# Patient Record
Sex: MALE | Race: WHITE | Employment: FULL TIME | URBAN - NONMETROPOLITAN AREA
[De-identification: names, ages, dates, MRNs, and addresses within clinical notes are randomized per-mention and may not be internally consistent; named-entity substitution may affect disease eponyms.]

---

## 2019-09-30 ENCOUNTER — APPOINTMENT (OUTPATIENT)
Dept: GENERAL RADIOLOGY | Age: 59
End: 2019-09-30
Payer: COMMERCIAL

## 2019-09-30 ENCOUNTER — HOSPITAL ENCOUNTER (EMERGENCY)
Age: 59
Discharge: HOME | End: 2019-09-30
Payer: COMMERCIAL

## 2019-09-30 VITALS
HEART RATE: 78 BPM | RESPIRATION RATE: 20 BRPM | OXYGEN SATURATION: 96 % | DIASTOLIC BLOOD PRESSURE: 74 MMHG | SYSTOLIC BLOOD PRESSURE: 103 MMHG | TEMPERATURE: 98.4 F

## 2019-09-30 DIAGNOSIS — S46.812A STRAIN OF LEFT DELTOID MUSCLE, INITIAL ENCOUNTER: Primary | ICD-10-CM

## 2019-09-30 PROCEDURE — 99283 EMERGENCY DEPT VISIT LOW MDM: CPT

## 2019-09-30 PROCEDURE — 73030 X-RAY EXAM OF SHOULDER: CPT

## 2019-09-30 PROCEDURE — 6370000000 HC RX 637 (ALT 250 FOR IP): Performed by: NURSE PRACTITIONER

## 2019-09-30 PROCEDURE — 2709999900 HC NON-CHARGEABLE SUPPLY

## 2019-09-30 RX ORDER — NAPROXEN 250 MG/1
500 TABLET ORAL ONCE
Status: COMPLETED | OUTPATIENT
Start: 2019-09-30 | End: 2019-09-30

## 2019-09-30 RX ORDER — NAPROXEN 500 MG/1
500 TABLET ORAL 2 TIMES DAILY WITH MEALS
Qty: 30 TABLET | Refills: 0 | Status: ON HOLD | OUTPATIENT
Start: 2019-09-30 | End: 2020-03-04 | Stop reason: HOSPADM

## 2019-09-30 RX ADMIN — NAPROXEN 500 MG: 250 TABLET ORAL at 14:32

## 2019-09-30 ASSESSMENT — PAIN DESCRIPTION - ORIENTATION: ORIENTATION: LEFT

## 2019-09-30 ASSESSMENT — PAIN SCALES - GENERAL
PAINLEVEL_OUTOF10: 0
PAINLEVEL_OUTOF10: 8

## 2019-09-30 ASSESSMENT — ENCOUNTER SYMPTOMS
COLOR CHANGE: 0
BACK PAIN: 0
CHEST TIGHTNESS: 0
SHORTNESS OF BREATH: 0

## 2019-09-30 ASSESSMENT — PAIN DESCRIPTION - PAIN TYPE: TYPE: ACUTE PAIN

## 2019-09-30 ASSESSMENT — PAIN DESCRIPTION - LOCATION: LOCATION: SHOULDER

## 2020-02-29 ENCOUNTER — APPOINTMENT (OUTPATIENT)
Dept: GENERAL RADIOLOGY | Age: 60
DRG: 287 | End: 2020-02-29
Payer: COMMERCIAL

## 2020-02-29 ENCOUNTER — HOSPITAL ENCOUNTER (INPATIENT)
Age: 60
LOS: 1 days | Discharge: HOME | DRG: 287 | End: 2020-03-04
Attending: FAMILY MEDICINE | Admitting: INTERNAL MEDICINE
Payer: COMMERCIAL

## 2020-02-29 PROBLEM — R07.9 CHEST PAIN: Status: ACTIVE | Noted: 2020-02-29

## 2020-02-29 PROBLEM — E11.9 DM (DIABETES MELLITUS) (HCC): Status: ACTIVE | Noted: 2020-02-29

## 2020-02-29 PROBLEM — E87.5 HYPERKALEMIA: Status: ACTIVE | Noted: 2020-02-29

## 2020-02-29 PROBLEM — R73.9 HYPERGLYCEMIA: Status: ACTIVE | Noted: 2020-02-29

## 2020-02-29 LAB
ALBUMIN SERPL-MCNC: 3.7 G/DL (ref 3.5–5.1)
ALP BLD-CCNC: 34 U/L (ref 38–126)
ALT SERPL-CCNC: 18 U/L (ref 11–66)
ANION GAP SERPL CALCULATED.3IONS-SCNC: 10 MEQ/L (ref 8–16)
AST SERPL-CCNC: 12 U/L (ref 5–40)
AVERAGE GLUCOSE: 153 MG/DL (ref 70–126)
BASOPHILS # BLD: 0.5 %
BASOPHILS ABSOLUTE: 0 THOU/MM3 (ref 0–0.1)
BILIRUB SERPL-MCNC: 0.3 MG/DL (ref 0.3–1.2)
BUN BLDV-MCNC: 14 MG/DL (ref 7–22)
CALCIUM SERPL-MCNC: 9.1 MG/DL (ref 8.5–10.5)
CHLORIDE BLD-SCNC: 100 MEQ/L (ref 98–111)
CO2: 28 MEQ/L (ref 23–33)
CREAT SERPL-MCNC: 0.8 MG/DL (ref 0.4–1.2)
EOSINOPHIL # BLD: 2.5 %
EOSINOPHILS ABSOLUTE: 0.1 THOU/MM3 (ref 0–0.4)
ERYTHROCYTE [DISTWIDTH] IN BLOOD BY AUTOMATED COUNT: 13.2 % (ref 11.5–14.5)
ERYTHROCYTE [DISTWIDTH] IN BLOOD BY AUTOMATED COUNT: 47.8 FL (ref 35–45)
GFR SERPL CREATININE-BSD FRML MDRD: > 90 ML/MIN/1.73M2
GLUCOSE BLD-MCNC: 138 MG/DL (ref 70–108)
GLUCOSE BLD-MCNC: 243 MG/DL (ref 70–108)
HBA1C MFR BLD: 7.1 % (ref 4.4–6.4)
HCT VFR BLD CALC: 38.7 % (ref 42–52)
HEMOGLOBIN: 12.4 GM/DL (ref 14–18)
IMMATURE GRANS (ABS): 0.02 THOU/MM3 (ref 0–0.07)
IMMATURE GRANULOCYTES: 0.4 %
LYMPHOCYTES # BLD: 27.3 %
LYMPHOCYTES ABSOLUTE: 1.5 THOU/MM3 (ref 1–4.8)
MCH RBC QN AUTO: 31.8 PG (ref 26–33)
MCHC RBC AUTO-ENTMCNC: 32 GM/DL (ref 32.2–35.5)
MCV RBC AUTO: 99.2 FL (ref 80–94)
MONOCYTES # BLD: 9.9 %
MONOCYTES ABSOLUTE: 0.6 THOU/MM3 (ref 0.4–1.3)
NUCLEATED RED BLOOD CELLS: 0 /100 WBC
OSMOLALITY CALCULATION: 284.2 MOSMOL/KG (ref 275–300)
PLATELET # BLD: 233 THOU/MM3 (ref 130–400)
PMV BLD AUTO: 9.4 FL (ref 9.4–12.4)
POTASSIUM SERPL-SCNC: 5.3 MEQ/L (ref 3.5–5.2)
PRO-BNP: 396 PG/ML (ref 0–900)
RBC # BLD: 3.9 MILL/MM3 (ref 4.7–6.1)
SEG NEUTROPHILS: 59.4 %
SEGMENTED NEUTROPHILS ABSOLUTE COUNT: 3.3 THOU/MM3 (ref 1.8–7.7)
SODIUM BLD-SCNC: 138 MEQ/L (ref 135–145)
TOTAL PROTEIN: 6 G/DL (ref 6.1–8)
TROPONIN T: < 0.01 NG/ML
TROPONIN T: < 0.01 NG/ML
WBC # BLD: 5.6 THOU/MM3 (ref 4.8–10.8)

## 2020-02-29 PROCEDURE — 93005 ELECTROCARDIOGRAM TRACING: CPT | Performed by: FAMILY MEDICINE

## 2020-02-29 PROCEDURE — 2580000003 HC RX 258: Performed by: INTERNAL MEDICINE

## 2020-02-29 PROCEDURE — 83036 HEMOGLOBIN GLYCOSYLATED A1C: CPT

## 2020-02-29 PROCEDURE — 6370000000 HC RX 637 (ALT 250 FOR IP): Performed by: FAMILY MEDICINE

## 2020-02-29 PROCEDURE — 6370000000 HC RX 637 (ALT 250 FOR IP): Performed by: INTERNAL MEDICINE

## 2020-02-29 PROCEDURE — G0378 HOSPITAL OBSERVATION PER HR: HCPCS

## 2020-02-29 PROCEDURE — 84484 ASSAY OF TROPONIN QUANT: CPT

## 2020-02-29 PROCEDURE — 36415 COLL VENOUS BLD VENIPUNCTURE: CPT

## 2020-02-29 PROCEDURE — 83880 ASSAY OF NATRIURETIC PEPTIDE: CPT

## 2020-02-29 PROCEDURE — 80053 COMPREHEN METABOLIC PANEL: CPT

## 2020-02-29 PROCEDURE — 82948 REAGENT STRIP/BLOOD GLUCOSE: CPT

## 2020-02-29 PROCEDURE — 6360000002 HC RX W HCPCS: Performed by: INTERNAL MEDICINE

## 2020-02-29 PROCEDURE — 71046 X-RAY EXAM CHEST 2 VIEWS: CPT

## 2020-02-29 PROCEDURE — 99285 EMERGENCY DEPT VISIT HI MDM: CPT

## 2020-02-29 PROCEDURE — 96372 THER/PROPH/DIAG INJ SC/IM: CPT

## 2020-02-29 PROCEDURE — 85025 COMPLETE CBC W/AUTO DIFF WBC: CPT

## 2020-02-29 PROCEDURE — 99220 PR INITIAL OBSERVATION CARE/DAY 70 MINUTES: CPT | Performed by: INTERNAL MEDICINE

## 2020-02-29 RX ORDER — PIOGLITAZONEHYDROCHLORIDE 45 MG/1
45 TABLET ORAL DAILY
COMMUNITY

## 2020-02-29 RX ORDER — ONDANSETRON 2 MG/ML
4 INJECTION INTRAMUSCULAR; INTRAVENOUS EVERY 6 HOURS PRN
Status: DISCONTINUED | OUTPATIENT
Start: 2020-02-29 | End: 2020-03-04 | Stop reason: HOSPADM

## 2020-02-29 RX ORDER — SIMVASTATIN 20 MG
20 TABLET ORAL NIGHTLY
COMMUNITY

## 2020-02-29 RX ORDER — SIMVASTATIN 20 MG
20 TABLET ORAL NIGHTLY
Status: DISCONTINUED | OUTPATIENT
Start: 2020-02-29 | End: 2020-03-01

## 2020-02-29 RX ORDER — GABAPENTIN 300 MG/1
300 CAPSULE ORAL 3 TIMES DAILY
Status: DISCONTINUED | OUTPATIENT
Start: 2020-02-29 | End: 2020-03-04 | Stop reason: HOSPADM

## 2020-02-29 RX ORDER — NITROGLYCERIN 0.4 MG/1
0.4 TABLET SUBLINGUAL EVERY 5 MIN PRN
Status: DISCONTINUED | OUTPATIENT
Start: 2020-02-29 | End: 2020-03-04 | Stop reason: HOSPADM

## 2020-02-29 RX ORDER — SODIUM CHLORIDE 0.9 % (FLUSH) 0.9 %
10 SYRINGE (ML) INJECTION EVERY 12 HOURS SCHEDULED
Status: DISCONTINUED | OUTPATIENT
Start: 2020-02-29 | End: 2020-03-04 | Stop reason: HOSPADM

## 2020-02-29 RX ORDER — ACETAMINOPHEN 325 MG/1
650 TABLET ORAL EVERY 6 HOURS PRN
Status: DISCONTINUED | OUTPATIENT
Start: 2020-02-29 | End: 2020-03-04 | Stop reason: HOSPADM

## 2020-02-29 RX ORDER — POLYETHYLENE GLYCOL 3350 17 G/17G
17 POWDER, FOR SOLUTION ORAL DAILY
Status: DISCONTINUED | OUTPATIENT
Start: 2020-02-29 | End: 2020-03-04 | Stop reason: HOSPADM

## 2020-02-29 RX ORDER — SODIUM CHLORIDE 9 MG/ML
INJECTION, SOLUTION INTRAVENOUS CONTINUOUS
Status: DISCONTINUED | OUTPATIENT
Start: 2020-02-29 | End: 2020-03-02

## 2020-02-29 RX ORDER — ESCITALOPRAM OXALATE 20 MG/1
20 TABLET ORAL DAILY
COMMUNITY

## 2020-02-29 RX ORDER — DEXTROSE MONOHYDRATE 25 G/50ML
12.5 INJECTION, SOLUTION INTRAVENOUS PRN
Status: DISCONTINUED | OUTPATIENT
Start: 2020-02-29 | End: 2020-03-04 | Stop reason: HOSPADM

## 2020-02-29 RX ORDER — ISOSORBIDE MONONITRATE 60 MG/1
60 TABLET, EXTENDED RELEASE ORAL DAILY
COMMUNITY
End: 2021-03-11 | Stop reason: SDUPTHER

## 2020-02-29 RX ORDER — ASPIRIN 81 MG/1
81 TABLET, CHEWABLE ORAL DAILY
Status: DISCONTINUED | OUTPATIENT
Start: 2020-03-01 | End: 2020-03-04 | Stop reason: HOSPADM

## 2020-02-29 RX ORDER — ACETAMINOPHEN 650 MG/1
650 SUPPOSITORY RECTAL EVERY 6 HOURS PRN
Status: DISCONTINUED | OUTPATIENT
Start: 2020-02-29 | End: 2020-03-04 | Stop reason: HOSPADM

## 2020-02-29 RX ORDER — PROMETHAZINE HYDROCHLORIDE 25 MG/1
12.5 TABLET ORAL EVERY 6 HOURS PRN
Status: DISCONTINUED | OUTPATIENT
Start: 2020-02-29 | End: 2020-03-04 | Stop reason: HOSPADM

## 2020-02-29 RX ORDER — GABAPENTIN 300 MG/1
300 CAPSULE ORAL 3 TIMES DAILY
Status: ON HOLD | COMMUNITY
End: 2022-09-12 | Stop reason: HOSPADM

## 2020-02-29 RX ORDER — DEXTROSE MONOHYDRATE 50 MG/ML
100 INJECTION, SOLUTION INTRAVENOUS PRN
Status: DISCONTINUED | OUTPATIENT
Start: 2020-02-29 | End: 2020-03-04 | Stop reason: HOSPADM

## 2020-02-29 RX ORDER — FAMOTIDINE 20 MG/1
20 TABLET, FILM COATED ORAL 2 TIMES DAILY
Status: DISCONTINUED | OUTPATIENT
Start: 2020-02-29 | End: 2020-03-04 | Stop reason: HOSPADM

## 2020-02-29 RX ORDER — LISINOPRIL 20 MG/1
20 TABLET ORAL DAILY
Status: DISCONTINUED | OUTPATIENT
Start: 2020-03-01 | End: 2020-03-01

## 2020-02-29 RX ORDER — ESCITALOPRAM OXALATE 20 MG/1
20 TABLET ORAL DAILY
Status: DISCONTINUED | OUTPATIENT
Start: 2020-03-01 | End: 2020-03-04 | Stop reason: HOSPADM

## 2020-02-29 RX ORDER — SODIUM CHLORIDE 0.9 % (FLUSH) 0.9 %
10 SYRINGE (ML) INJECTION PRN
Status: DISCONTINUED | OUTPATIENT
Start: 2020-02-29 | End: 2020-03-04 | Stop reason: HOSPADM

## 2020-02-29 RX ORDER — NICOTINE POLACRILEX 4 MG
15 LOZENGE BUCCAL PRN
Status: DISCONTINUED | OUTPATIENT
Start: 2020-02-29 | End: 2020-03-04 | Stop reason: HOSPADM

## 2020-02-29 RX ORDER — LISINOPRIL 20 MG/1
10 TABLET ORAL DAILY
Status: ON HOLD | COMMUNITY
End: 2020-03-04 | Stop reason: HOSPADM

## 2020-02-29 RX ORDER — ISOSORBIDE MONONITRATE 60 MG/1
60 TABLET, EXTENDED RELEASE ORAL DAILY
Status: DISCONTINUED | OUTPATIENT
Start: 2020-03-01 | End: 2020-03-04 | Stop reason: HOSPADM

## 2020-02-29 RX ADMIN — SODIUM CHLORIDE: 9 INJECTION, SOLUTION INTRAVENOUS at 21:04

## 2020-02-29 RX ADMIN — NITROGLYCERIN 0.4 MG: 0.4 TABLET, ORALLY DISINTEGRATING SUBLINGUAL at 17:41

## 2020-02-29 RX ADMIN — ENOXAPARIN SODIUM 40 MG: 40 INJECTION SUBCUTANEOUS at 20:57

## 2020-02-29 RX ADMIN — FAMOTIDINE 20 MG: 20 TABLET ORAL at 20:57

## 2020-02-29 RX ADMIN — GABAPENTIN 300 MG: 300 CAPSULE ORAL at 21:58

## 2020-02-29 ASSESSMENT — PAIN DESCRIPTION - DESCRIPTORS
DESCRIPTORS: PRESSURE

## 2020-02-29 ASSESSMENT — PAIN DESCRIPTION - PAIN TYPE
TYPE: ACUTE PAIN;CHRONIC PAIN

## 2020-02-29 ASSESSMENT — PAIN SCALES - GENERAL
PAINLEVEL_OUTOF10: 3
PAINLEVEL_OUTOF10: 0
PAINLEVEL_OUTOF10: 3
PAINLEVEL_OUTOF10: 1
PAINLEVEL_OUTOF10: 0

## 2020-02-29 ASSESSMENT — ENCOUNTER SYMPTOMS
ABDOMINAL PAIN: 0
BACK PAIN: 0
BLOOD IN STOOL: 0
SORE THROAT: 0
RHINORRHEA: 0
VOMITING: 0
COUGH: 0
SHORTNESS OF BREATH: 1
DIARRHEA: 0
CONSTIPATION: 0
NAUSEA: 0

## 2020-02-29 ASSESSMENT — PAIN DESCRIPTION - LOCATION
LOCATION: CHEST
LOCATION: CHEST

## 2020-02-29 ASSESSMENT — HEART SCORE: ECG: 1

## 2020-02-29 NOTE — ED PROVIDER NOTES
Perlita Garcia 13 COMPLAINT       Chief Complaint   Patient presents with    Chest Pain     for over a month    Shortness of Breath       Nurses Notes reviewed and I agree except as noted in the HPI. HISTORY OF PRESENT ILLNESS    Humaira Andres is a 60 y.o. male who presents to the Emergency Department for the evaluation of chest pain and shortness of breath intermittently for the last month, rating his current pain 1/10 in severity. Patient describes his chest pain as heaviness in character. He reports shortness of breath with and with out exertion. Patient has been evaluated by his PCP for symptoms prior to ED evaluation today, and was told to have a stress test performed and a follow up with cardiology. He denies fever, chills, nausea, and vomiting. Patient denies diarrhea and constipation. Patient denies visual disturbances, dizziness, headache, light-headedness, numbness, weakness, and syncope. Patient reports a history of smoking, but states that he has ceased use. Patient has a past medical history of HTN and HLD. He reports a family history of heart disease. There are no other complaints, symptoms, or concerns on initial encounter. The HPI was provided by the patient. REVIEW OF SYSTEMS     Review of Systems   Constitutional: Negative for chills and fever. HENT: Negative for congestion, ear pain, rhinorrhea and sore throat. Eyes: Negative for visual disturbance. Respiratory: Positive for shortness of breath. Negative for cough. Cardiovascular: Positive for chest pain. Negative for leg swelling. Gastrointestinal: Negative for abdominal pain, blood in stool, constipation, diarrhea, nausea and vomiting. Genitourinary: Negative for decreased urine volume, difficulty urinating, dysuria, frequency, hematuria and urgency. Musculoskeletal: Negative for arthralgias, back pain, myalgias and neck pain. Skin: Negative for rash. atraumatic. Right Ear: Tympanic membrane and external ear normal.      Left Ear: Tympanic membrane and external ear normal.      Nose: Nose normal.      Mouth/Throat:      Pharynx: No oropharyngeal exudate or posterior oropharyngeal erythema. Eyes:      Conjunctiva/sclera: Conjunctivae normal.   Neck:      Musculoskeletal: Normal range of motion and neck supple. Vascular: No JVD. Cardiovascular:      Rate and Rhythm: Normal rate and regular rhythm. Pulses: Normal pulses. Heart sounds: Normal heart sounds. No murmur. No friction rub. No gallop. Pulmonary:      Effort: Pulmonary effort is normal. No respiratory distress. Breath sounds: Normal breath sounds. No decreased breath sounds, wheezing, rhonchi or rales. Abdominal:      General: Bowel sounds are normal. There is no distension. Palpations: Abdomen is soft. Tenderness: There is no abdominal tenderness. There is no guarding or rebound. Musculoskeletal: Normal range of motion. Skin:     General: Skin is warm and dry. Findings: No rash. Neurological:      Mental Status: He is alert and oriented to person, place, and time. Motor: No abnormal muscle tone. Coordination: Coordination normal.         DIFFERENTIAL DIAGNOSIS:   Differential Dx Lists - I consider the following to be a partial list of the possible etiologies for the patient's symptoms and based on my clinical findings as well and are part of my medical decision making:    Chest Pain: ACS, myocardial infarction, pericarditis, costochondritis, pneumothorax, pneumonia, mediastinum infection, and other    DIAGNOSTIC RESULTS     EKG: All EKG's are interpreted by the Emergency Department Physician who either signs or Co-signs this chart in the absence of a cardiologist.  EKG interpreted by Ruth Rm MD:    Vent.  Rate: 75 bpm  IN interval: 106 ms  QRS duration: 112 ms  QTc: 464 ms  P-R-T axes: 52, 31, 51  Sinus rhythm with short IN  Otherwise

## 2020-02-29 NOTE — ED TRIAGE NOTES
Pt to rm 08 per intake c/o ongoing chest pain/pressure for over a month. States he has been to see his PCP the past two Fridays for this is being set up for OP cardiology and stress testing. On arrival, pt appears in no acute distress, states pain/pressure is very mild at this time, rates 3/10. VSS. No other needs or complaints voiced.  Assessment complete- will monitor

## 2020-03-01 LAB
ANION GAP SERPL CALCULATED.3IONS-SCNC: 6 MEQ/L (ref 8–16)
AVERAGE GLUCOSE: 150 MG/DL (ref 70–126)
BUN BLDV-MCNC: 11 MG/DL (ref 7–22)
CALCIUM SERPL-MCNC: 8.8 MG/DL (ref 8.5–10.5)
CHLORIDE BLD-SCNC: 106 MEQ/L (ref 98–111)
CHOLESTEROL, TOTAL: 110 MG/DL (ref 100–199)
CO2: 28 MEQ/L (ref 23–33)
CREAT SERPL-MCNC: 0.8 MG/DL (ref 0.4–1.2)
ERYTHROCYTE [DISTWIDTH] IN BLOOD BY AUTOMATED COUNT: 13.3 % (ref 11.5–14.5)
ERYTHROCYTE [DISTWIDTH] IN BLOOD BY AUTOMATED COUNT: 50.3 FL (ref 35–45)
GFR SERPL CREATININE-BSD FRML MDRD: > 90 ML/MIN/1.73M2
GLUCOSE BLD-MCNC: 121 MG/DL (ref 70–108)
GLUCOSE BLD-MCNC: 131 MG/DL (ref 70–108)
GLUCOSE BLD-MCNC: 151 MG/DL (ref 70–108)
GLUCOSE BLD-MCNC: 204 MG/DL (ref 70–108)
GLUCOSE BLD-MCNC: 236 MG/DL (ref 70–108)
HBA1C MFR BLD: 7 % (ref 4.4–6.4)
HCT VFR BLD CALC: 40 % (ref 42–52)
HDLC SERPL-MCNC: 29 MG/DL
HEMOGLOBIN: 12.6 GM/DL (ref 14–18)
LDL CHOLESTEROL CALCULATED: 49 MG/DL
MCH RBC QN AUTO: 32.2 PG (ref 26–33)
MCHC RBC AUTO-ENTMCNC: 31.5 GM/DL (ref 32.2–35.5)
MCV RBC AUTO: 102.3 FL (ref 80–94)
PLATELET # BLD: 211 THOU/MM3 (ref 130–400)
PMV BLD AUTO: 9.3 FL (ref 9.4–12.4)
POTASSIUM REFLEX MAGNESIUM: 5.4 MEQ/L (ref 3.5–5.2)
POTASSIUM SERPL-SCNC: 4.8 MEQ/L (ref 3.5–5.2)
RBC # BLD: 3.91 MILL/MM3 (ref 4.7–6.1)
SODIUM BLD-SCNC: 140 MEQ/L (ref 135–145)
TRIGL SERPL-MCNC: 161 MG/DL (ref 0–199)
TROPONIN T: < 0.01 NG/ML
WBC # BLD: 4.1 THOU/MM3 (ref 4.8–10.8)

## 2020-03-01 PROCEDURE — 96372 THER/PROPH/DIAG INJ SC/IM: CPT

## 2020-03-01 PROCEDURE — 93005 ELECTROCARDIOGRAM TRACING: CPT | Performed by: INTERNAL MEDICINE

## 2020-03-01 PROCEDURE — 80061 LIPID PANEL: CPT

## 2020-03-01 PROCEDURE — 6370000000 HC RX 637 (ALT 250 FOR IP): Performed by: INTERNAL MEDICINE

## 2020-03-01 PROCEDURE — 82948 REAGENT STRIP/BLOOD GLUCOSE: CPT

## 2020-03-01 PROCEDURE — 6370000000 HC RX 637 (ALT 250 FOR IP): Performed by: FAMILY MEDICINE

## 2020-03-01 PROCEDURE — 94760 N-INVAS EAR/PLS OXIMETRY 1: CPT

## 2020-03-01 PROCEDURE — 83036 HEMOGLOBIN GLYCOSYLATED A1C: CPT

## 2020-03-01 PROCEDURE — 2580000003 HC RX 258: Performed by: INTERNAL MEDICINE

## 2020-03-01 PROCEDURE — G0378 HOSPITAL OBSERVATION PER HR: HCPCS

## 2020-03-01 PROCEDURE — 36415 COLL VENOUS BLD VENIPUNCTURE: CPT

## 2020-03-01 PROCEDURE — 6360000002 HC RX W HCPCS: Performed by: INTERNAL MEDICINE

## 2020-03-01 PROCEDURE — 99233 SBSQ HOSP IP/OBS HIGH 50: CPT | Performed by: FAMILY MEDICINE

## 2020-03-01 PROCEDURE — 84132 ASSAY OF SERUM POTASSIUM: CPT

## 2020-03-01 PROCEDURE — 85027 COMPLETE CBC AUTOMATED: CPT

## 2020-03-01 PROCEDURE — 80048 BASIC METABOLIC PNL TOTAL CA: CPT

## 2020-03-01 PROCEDURE — 94640 AIRWAY INHALATION TREATMENT: CPT

## 2020-03-01 PROCEDURE — 99204 OFFICE O/P NEW MOD 45 MIN: CPT | Performed by: NUCLEAR MEDICINE

## 2020-03-01 RX ORDER — ALBUTEROL SULFATE 90 UG/1
2 AEROSOL, METERED RESPIRATORY (INHALATION) PRN
Status: CANCELLED | OUTPATIENT
Start: 2020-03-01 | End: 2020-03-01

## 2020-03-01 RX ORDER — SODIUM CHLORIDE 0.9 % (FLUSH) 0.9 %
10 SYRINGE (ML) INJECTION PRN
Status: CANCELLED | OUTPATIENT
Start: 2020-03-01 | End: 2020-03-01

## 2020-03-01 RX ORDER — SODIUM CHLORIDE 9 MG/ML
500 INJECTION, SOLUTION INTRAVENOUS CONTINUOUS PRN
Status: CANCELLED | OUTPATIENT
Start: 2020-03-01 | End: 2020-03-01

## 2020-03-01 RX ORDER — AMINOPHYLLINE DIHYDRATE 25 MG/ML
50 INJECTION, SOLUTION INTRAVENOUS PRN
Status: CANCELLED | OUTPATIENT
Start: 2020-03-01 | End: 2020-03-01

## 2020-03-01 RX ORDER — IPRATROPIUM BROMIDE AND ALBUTEROL SULFATE 2.5; .5 MG/3ML; MG/3ML
1 SOLUTION RESPIRATORY (INHALATION) EVERY 6 HOURS
Status: DISCONTINUED | OUTPATIENT
Start: 2020-03-01 | End: 2020-03-03

## 2020-03-01 RX ORDER — LISINOPRIL 10 MG/1
10 TABLET ORAL DAILY
Status: DISCONTINUED | OUTPATIENT
Start: 2020-03-01 | End: 2020-03-04 | Stop reason: HOSPADM

## 2020-03-01 RX ORDER — METOPROLOL TARTRATE 5 MG/5ML
5 INJECTION INTRAVENOUS EVERY 5 MIN PRN
Status: CANCELLED | OUTPATIENT
Start: 2020-03-01 | End: 2020-03-01

## 2020-03-01 RX ORDER — ATROPINE SULFATE 0.1 MG/ML
0.5 INJECTION INTRAVENOUS EVERY 5 MIN PRN
Status: CANCELLED | OUTPATIENT
Start: 2020-03-01 | End: 2020-03-01

## 2020-03-01 RX ORDER — ATORVASTATIN CALCIUM 40 MG/1
40 TABLET, FILM COATED ORAL NIGHTLY
Status: DISCONTINUED | OUTPATIENT
Start: 2020-03-01 | End: 2020-03-04 | Stop reason: HOSPADM

## 2020-03-01 RX ORDER — NITROGLYCERIN 0.4 MG/1
0.4 TABLET SUBLINGUAL EVERY 5 MIN PRN
Status: CANCELLED | OUTPATIENT
Start: 2020-03-01 | End: 2020-03-01

## 2020-03-01 RX ADMIN — SODIUM CHLORIDE: 9 INJECTION, SOLUTION INTRAVENOUS at 17:23

## 2020-03-01 RX ADMIN — GABAPENTIN 300 MG: 300 CAPSULE ORAL at 08:50

## 2020-03-01 RX ADMIN — ENOXAPARIN SODIUM 40 MG: 40 INJECTION SUBCUTANEOUS at 20:19

## 2020-03-01 RX ADMIN — FAMOTIDINE 20 MG: 20 TABLET ORAL at 08:50

## 2020-03-01 RX ADMIN — ISOSORBIDE MONONITRATE 60 MG: 60 TABLET ORAL at 08:50

## 2020-03-01 RX ADMIN — ATORVASTATIN CALCIUM 40 MG: 40 TABLET, FILM COATED ORAL at 20:18

## 2020-03-01 RX ADMIN — INSULIN LISPRO 1 UNITS: 100 INJECTION, SOLUTION INTRAVENOUS; SUBCUTANEOUS at 17:24

## 2020-03-01 RX ADMIN — ESCITALOPRAM 20 MG: 20 TABLET, FILM COATED ORAL at 08:50

## 2020-03-01 RX ADMIN — GABAPENTIN 300 MG: 300 CAPSULE ORAL at 20:18

## 2020-03-01 RX ADMIN — IPRATROPIUM BROMIDE AND ALBUTEROL SULFATE 1 AMPULE: .5; 3 SOLUTION RESPIRATORY (INHALATION) at 07:59

## 2020-03-01 RX ADMIN — ASPIRIN 81 MG 81 MG: 81 TABLET ORAL at 08:50

## 2020-03-01 RX ADMIN — FAMOTIDINE 20 MG: 20 TABLET ORAL at 20:19

## 2020-03-01 RX ADMIN — IPRATROPIUM BROMIDE AND ALBUTEROL SULFATE 1 AMPULE: .5; 3 SOLUTION RESPIRATORY (INHALATION) at 17:51

## 2020-03-01 RX ADMIN — IPRATROPIUM BROMIDE AND ALBUTEROL SULFATE 1 AMPULE: .5; 3 SOLUTION RESPIRATORY (INHALATION) at 11:49

## 2020-03-01 RX ADMIN — GABAPENTIN 300 MG: 300 CAPSULE ORAL at 13:37

## 2020-03-01 RX ADMIN — LISINOPRIL 10 MG: 20 TABLET ORAL at 08:50

## 2020-03-01 RX ADMIN — INSULIN LISPRO 1 UNITS: 100 INJECTION, SOLUTION INTRAVENOUS; SUBCUTANEOUS at 20:20

## 2020-03-01 ASSESSMENT — PAIN SCALES - GENERAL
PAINLEVEL_OUTOF10: 0

## 2020-03-01 NOTE — H&P
History & Physical        Patient:  Timothy Kyle  YOB: 1960    MRN: [id number]     Acct: [de-identified]        Assessment and Plan:        1. Chest pain: Which was relieved by nitroglycerin, patient request Dr. Sylvia Edwards or someone in his group. Trend troponins  2. Diabetes mellitus we will place on a insulin sliding scale and hold his home medications  3. Hyperkalemia: We will start IV fluids\  4. Hyperlipidemia we will continue with his home meds  5. Hypertension we will continue with his home medication    CC: Chest pain x1 month, now with increased shortness of breath    HPI: 14-year-old male presents to the emergency department for evaluation of chest pain shortness of breath. Chest pain is been intermittent for the last month his current pain is 1 out of 10 in severity. He describes his chest pain as heaviness in character shortness of breath with and without exertion. He has been evaluated by his PCP was told to have a stress test performed and follow-up with cardiology. He denies fever chills nausea or vomiting denies constipation or diarrhea patient has reports that his smoking history of 3 packs/day but quit in 1997 he has a past medical history of hypertension hyperlipidemia    ROS (14 point review of systems completed. Pertinent positives noted. Otherwise ROS is negative) : Shortness of breath  Chest pain relieved by nitro glycerin    PMH:  Per HPI and       Diagnosis Date    Anxiety     Hyperlipidemia     Hypertension     Neuropathy      SHX:        Procedure Laterality Date    BACK SURGERY      CHOLECYSTECTOMY      CORONARY ANGIOPLASTY      no stents    VASECTOMY      VASECTOMY REVERSAL       FHX: History reviewed. No pertinent family history.   SOCHX:   Social History     Socioeconomic History    Marital status:      Spouse name: None    Number of children: None    Years of education: None    Highest education level: None   Occupational History    None rebound. Musculoskeletal:  No clubbing, cyanosis or edema bilaterally. Full range of motion without deformity. Skin: Skin color, texture, turgor normal.  No rashes or lesions, or suspicious lesions. Neurologic:  Neurovascularly intact without any focal sensory/motor deficits. Cranial nerves: II-XII intact, grossly non-focal.  Psychiatric:  Alert and oriented, thought content appropriate, normal insight  Capillary Refill: Brisk,< 2 seconds   Peripheral Pulses: +2 palpable, equal bilaterally upper and lower extremities  Lymphatics: no lymphadenopathy    Data: (All radiographs, tracings, PFTs, and imaging are personally viewed and interpreted unless otherwise noted).  Potassium 5.3    glucose 243   Troponin less than 0.01   Platelets 308   EKG no acute changes per my read  Recent Labs     02/29/20  1803   WBC 5.6   HGB 12.4*   HCT 38.7*         Recent Labs     02/29/20  1803      K 5.3*      CO2 28   BUN 14   CREATININE 0.8   CALCIUM 9.1      Recent Labs     02/29/20  1803   AST 12   ALT 18   BILITOT 0.3   ALKPHOS 34*       No results for input(s): INR in the last 72 hours.  No results for input(s): Alferd Thalia in the last 72 hours. Radiology reports-   Xr Chest Standard (2 Vw)    Result Date: 2/29/2020  PROCEDURE: XR CHEST (2 VW) CLINICAL INFORMATION: cp dyspnea. COMPARISON: No prior study. TECHNIQUE: PA and lateral views the chest. FINDINGS: Heart size is normal. No pleural effusions. Mild dextroscoliosis. No acute infiltrate. No acute disease. **This report has been created using voice recognition software. It may contain minor errors which are inherent in voice recognition technology. ** Final report electronically signed by Dr. Amanda Fine on 2/29/2020 5:59 PM      Electronically signed by Anjelica Esteban DO on 2/29/2020 at 7:32 PM

## 2020-03-01 NOTE — PLAN OF CARE
Problem: Discharge Planning:  Goal: Discharged to appropriate level of care  Description  Discharged to appropriate level of care  Outcome: Ongoing  Note:   Patient plans to discharge home when medically stable. No discharge orders at this time. Problem: Cardiac Output - Decreased:  Goal: Hemodynamic stability will improve  Description  Hemodynamic stability will improve  Outcome: Ongoing  Note:   Patient denies chest pain this shift. C/o chest pressure for the last month, has not changed since admission. Pt educated to notify nursing staff if developing chest pain, pressure, dizziness, SOB. Patient verbalizes understanding. Cardiology to see in AM.     Problem: Serum Glucose Level - Abnormal:  Goal: Ability to maintain appropriate glucose levels will improve  Description  Ability to maintain appropriate glucose levels will improve  Outcome: Ongoing  Note:   BG checked this shift. Sliding scale insulin available per MAR orders. No coverage given this shift d/t BG under ordered parameters. Problem: Tissue Perfusion - Cardiopulmonary, Altered:  Goal: Absence of angina  Description  Absence of angina  Outcome: Ongoing  Note:   Patient denies CP this shift. PRN medication available if needed. Will continue monitoring. Cardiology to see in AM.  Goal: Circulatory function within specified parameters  Description  Circulatory function within specified parameters  Outcome: Ongoing  Note:   VSS. Continues on telemetry monitoring. Pulses palpable x4 extremities, skin warm to touch. A&Ox4. Will continue monitoring. Goal: Hemodynamic stability will improve  Description  Hemodynamic stability will improve  Outcome: Ongoing  Note:   VSS. Continues on IV fluids. Repeat labwork and EKG in AM. Cardiology to see in AM.    Care plan reviewed with patient and wife. Patient and wife verbalize understanding of the plan of care and contribute to goal setting.

## 2020-03-01 NOTE — ED NOTES
Patient updated on room number for in house bed. Told will be taken up as soon as someone is available to transport. Patient and wife both denies concerns at time.       Zita Mehta RN  02/29/20 1871

## 2020-03-01 NOTE — PROGRESS NOTES
Hospitalist Progress Note      Patient:  Colten Guevara    Unit/Bed:8B-32/032-A  YOB: 1960  MRN: [id number]   Acct: [de-identified]   PCP: Vani Jones MD  Date of Admission: 2/29/2020    Assessment and Plan:        1. Atypical chest pain: Received nitro sublingual in the ER which relieved his pain. EKG on admission was normal, 3 sets of tropes with BMP all normal, cardiology consulted, we will order Lexiscan stress test in a.m. and ECHO. 2. CAD status post left heart cath 2001 with no stent: Continue ASA, increase lipid-lowering therapy to high intensity using Lipitor 40 mg daily, ACE inhibitor, patient blood pressure does not allow adding beta-blocker and he is sometimes bradycardic. 3. Hyperkalemia: Decrease lisinopril to 10 mg instead of 20 mg, EKG normal, give 2 DuoNeb's and monitor closely, repeat potassium level later today. 4. Essential hypertension: Decrease lisinopril dose to 10 mg daily instead of 20 because of hyperkalemia  5. Dyslipidemia: Switch simvastatin 20 mg nightly to high intensity lipid-lowering therapy Lipitor 40 mg for secondary protection from CVAs and CVEs  6. Type 2 diabetes controlled: Last A1c 7 as of 2/29/2020, continue Actos, GLP-1 agonist, continue sliding scale, continue AC/at bedtime blood sugar checks, diabetic diet, monitor closely. 7. Depression: Continue Lexapro 20 mg daily  8. Diabetic neuropathy: Continue gabapentin 300 mg 3 times daily    PMH, PSH, SH, FHX reviewed in chart review snap shot in EPIC    CC: Atypical chest pain  HPI: Initial admission HPI reviewed as below:  60-year-old male presents to the emergency department for evaluation of chest pain shortness of breath. Chest pain is been intermittent for the last month his current pain is 1 out of 10 in severity. He describes his chest pain as heaviness in character shortness of breath with and without exertion.   He has been evaluated by his PCP was told to have a stress test performed and PM        Xr Chest Standard (2 Vw)    Result Date: 2/29/2020  PROCEDURE: XR CHEST (2 VW) CLINICAL INFORMATION: cp dyspnea. COMPARISON: No prior study. TECHNIQUE: PA and lateral views the chest. FINDINGS: Heart size is normal. No pleural effusions. Mild dextroscoliosis. No acute infiltrate. No acute disease. **This report has been created using voice recognition software. It may contain minor errors which are inherent in voice recognition technology. ** Final report electronically signed by Dr. Candiec Dee on 2/29/2020 5:59 PM      Discussed plan with patient and family. Patient and family verbalized understanding and agree. All questions addressed with concerns. Please excuse my TYPOS!     Electronically signed by Nara Trotter MD on 3/1/2020 at 7:43 AM

## 2020-03-01 NOTE — PLAN OF CARE
Problem: Discharge Planning:  Goal: Discharged to appropriate level of care  Description  Discharged to appropriate level of care  3/1/2020 1017 by Laura Momin RN  Outcome: Ongoing  Note:   Discharge home pending stress test tomorrow. Problem: Cardiac Output - Decreased:  Goal: Hemodynamic stability will improve  Description  Hemodynamic stability will improve  3/1/2020 1017 by Laura Momin RN  Outcome: Ongoing  Note:   See labs/vitals     Problem: Serum Glucose Level - Abnormal:  Goal: Ability to maintain appropriate glucose levels will improve  Description  Ability to maintain appropriate glucose levels will improve  3/1/2020 1017 by Laura Momin RN  Outcome: Ongoing  Note:   [facility]     Problem: Tissue Perfusion - Cardiopulmonary, Altered:  Goal: Absence of angina  Description  Absence of angina  3/1/2020 1017 by Laura Momin RN  Outcome: Ongoing  Note:   Slight pressure in left chest.      Problem: Tissue Perfusion - Cardiopulmonary, Altered:  Goal: Circulatory function within specified parameters  Description  Circulatory function within specified parameters  3/1/2020 1017 by Laura Momin RN  Outcome: Ongoing  Note:   See vitals     Problem: Tissue Perfusion - Cardiopulmonary, Altered:  Goal: Hemodynamic stability will improve  Description  Hemodynamic stability will improve  3/1/2020 1017 by Laura Momin RN  Outcome: Ongoing  Note:   See labs/vitals. Care plan reviewed with patient. Patient verbalize understanding of the plan of care and contribute to goal setting.

## 2020-03-02 ENCOUNTER — APPOINTMENT (OUTPATIENT)
Dept: NON INVASIVE DIAGNOSTICS | Age: 60
DRG: 287 | End: 2020-03-02
Payer: COMMERCIAL

## 2020-03-02 LAB
ANION GAP SERPL CALCULATED.3IONS-SCNC: 11 MEQ/L (ref 8–16)
BASOPHILS # BLD: 0.5 %
BASOPHILS ABSOLUTE: 0 THOU/MM3 (ref 0–0.1)
BUN BLDV-MCNC: 12 MG/DL (ref 7–22)
CALCIUM SERPL-MCNC: 9.2 MG/DL (ref 8.5–10.5)
CHLORIDE BLD-SCNC: 109 MEQ/L (ref 98–111)
CO2: 24 MEQ/L (ref 23–33)
CREAT SERPL-MCNC: 0.9 MG/DL (ref 0.4–1.2)
EKG ATRIAL RATE: 57 BPM
EKG ATRIAL RATE: 75 BPM
EKG P AXIS: 47 DEGREES
EKG P AXIS: 52 DEGREES
EKG P-R INTERVAL: 106 MS
EKG P-R INTERVAL: 114 MS
EKG Q-T INTERVAL: 416 MS
EKG Q-T INTERVAL: 442 MS
EKG QRS DURATION: 112 MS
EKG QRS DURATION: 90 MS
EKG QTC CALCULATION (BAZETT): 430 MS
EKG QTC CALCULATION (BAZETT): 464 MS
EKG R AXIS: 23 DEGREES
EKG R AXIS: 31 DEGREES
EKG T AXIS: 32 DEGREES
EKG T AXIS: 51 DEGREES
EKG VENTRICULAR RATE: 57 BPM
EKG VENTRICULAR RATE: 75 BPM
EOSINOPHIL # BLD: 1.7 %
EOSINOPHILS ABSOLUTE: 0.1 THOU/MM3 (ref 0–0.4)
ERYTHROCYTE [DISTWIDTH] IN BLOOD BY AUTOMATED COUNT: 13.4 % (ref 11.5–14.5)
ERYTHROCYTE [DISTWIDTH] IN BLOOD BY AUTOMATED COUNT: 49.4 FL (ref 35–45)
GFR SERPL CREATININE-BSD FRML MDRD: 86 ML/MIN/1.73M2
GLUCOSE BLD-MCNC: 152 MG/DL (ref 70–108)
GLUCOSE BLD-MCNC: 155 MG/DL (ref 70–108)
GLUCOSE BLD-MCNC: 158 MG/DL (ref 70–108)
GLUCOSE BLD-MCNC: 176 MG/DL (ref 70–108)
GLUCOSE BLD-MCNC: 203 MG/DL (ref 70–108)
HCT VFR BLD CALC: 38.8 % (ref 42–52)
HEMOGLOBIN: 12.3 GM/DL (ref 14–18)
IMMATURE GRANS (ABS): 0.04 THOU/MM3 (ref 0–0.07)
IMMATURE GRANULOCYTES: 1 %
LV EF: 55 %
LV EF: 62 %
LVEF MODALITY: NORMAL
LVEF MODALITY: NORMAL
LYMPHOCYTES # BLD: 27.9 %
LYMPHOCYTES ABSOLUTE: 1.1 THOU/MM3 (ref 1–4.8)
MCH RBC QN AUTO: 31.8 PG (ref 26–33)
MCHC RBC AUTO-ENTMCNC: 31.7 GM/DL (ref 32.2–35.5)
MCV RBC AUTO: 100.3 FL (ref 80–94)
MONOCYTES # BLD: 11 %
MONOCYTES ABSOLUTE: 0.5 THOU/MM3 (ref 0.4–1.3)
NUCLEATED RED BLOOD CELLS: 0 /100 WBC
PLATELET # BLD: 192 THOU/MM3 (ref 130–400)
PMV BLD AUTO: 9.1 FL (ref 9.4–12.4)
POTASSIUM SERPL-SCNC: 4.5 MEQ/L (ref 3.5–5.2)
POTASSIUM SERPL-SCNC: 5.5 MEQ/L (ref 3.5–5.2)
RBC # BLD: 3.87 MILL/MM3 (ref 4.7–6.1)
SEG NEUTROPHILS: 57.9 %
SEGMENTED NEUTROPHILS ABSOLUTE COUNT: 2.4 THOU/MM3 (ref 1.8–7.7)
SODIUM BLD-SCNC: 144 MEQ/L (ref 135–145)
WBC # BLD: 4.1 THOU/MM3 (ref 4.8–10.8)

## 2020-03-02 PROCEDURE — 99233 SBSQ HOSP IP/OBS HIGH 50: CPT | Performed by: FAMILY MEDICINE

## 2020-03-02 PROCEDURE — 6370000000 HC RX 637 (ALT 250 FOR IP): Performed by: FAMILY MEDICINE

## 2020-03-02 PROCEDURE — 80048 BASIC METABOLIC PNL TOTAL CA: CPT

## 2020-03-02 PROCEDURE — 93017 CV STRESS TEST TRACING ONLY: CPT

## 2020-03-02 PROCEDURE — 93010 ELECTROCARDIOGRAM REPORT: CPT | Performed by: INTERNAL MEDICINE

## 2020-03-02 PROCEDURE — 78452 HT MUSCLE IMAGE SPECT MULT: CPT

## 2020-03-02 PROCEDURE — 94640 AIRWAY INHALATION TREATMENT: CPT

## 2020-03-02 PROCEDURE — 36415 COLL VENOUS BLD VENIPUNCTURE: CPT

## 2020-03-02 PROCEDURE — G0378 HOSPITAL OBSERVATION PER HR: HCPCS

## 2020-03-02 PROCEDURE — 6370000000 HC RX 637 (ALT 250 FOR IP): Performed by: INTERNAL MEDICINE

## 2020-03-02 PROCEDURE — 93017 CV STRESS TEST TRACING ONLY: CPT | Performed by: NUCLEAR MEDICINE

## 2020-03-02 PROCEDURE — 2580000003 HC RX 258: Performed by: INTERNAL MEDICINE

## 2020-03-02 PROCEDURE — 3430000000 HC RX DIAGNOSTIC RADIOPHARMACEUTICAL: Performed by: FAMILY MEDICINE

## 2020-03-02 PROCEDURE — 82948 REAGENT STRIP/BLOOD GLUCOSE: CPT

## 2020-03-02 PROCEDURE — 85025 COMPLETE CBC W/AUTO DIFF WBC: CPT

## 2020-03-02 PROCEDURE — 84132 ASSAY OF SERUM POTASSIUM: CPT

## 2020-03-02 PROCEDURE — 94761 N-INVAS EAR/PLS OXIMETRY MLT: CPT

## 2020-03-02 PROCEDURE — A9500 TC99M SESTAMIBI: HCPCS | Performed by: FAMILY MEDICINE

## 2020-03-02 PROCEDURE — 93306 TTE W/DOPPLER COMPLETE: CPT

## 2020-03-02 RX ORDER — SODIUM POLYSTYRENE SULFONATE 15 G/60ML
15 SUSPENSION ORAL; RECTAL ONCE
Status: COMPLETED | OUTPATIENT
Start: 2020-03-02 | End: 2020-03-02

## 2020-03-02 RX ORDER — HYDRALAZINE HYDROCHLORIDE 20 MG/ML
5 INJECTION INTRAMUSCULAR; INTRAVENOUS EVERY 6 HOURS PRN
Status: DISCONTINUED | OUTPATIENT
Start: 2020-03-02 | End: 2020-03-04 | Stop reason: HOSPADM

## 2020-03-02 RX ADMIN — FAMOTIDINE 20 MG: 20 TABLET ORAL at 11:10

## 2020-03-02 RX ADMIN — ISOSORBIDE MONONITRATE 60 MG: 60 TABLET ORAL at 11:10

## 2020-03-02 RX ADMIN — Medication 35 MILLICURIE: at 09:58

## 2020-03-02 RX ADMIN — INSULIN LISPRO 1 UNITS: 100 INJECTION, SOLUTION INTRAVENOUS; SUBCUTANEOUS at 20:35

## 2020-03-02 RX ADMIN — GABAPENTIN 300 MG: 300 CAPSULE ORAL at 11:10

## 2020-03-02 RX ADMIN — ATORVASTATIN CALCIUM 40 MG: 40 TABLET, FILM COATED ORAL at 20:34

## 2020-03-02 RX ADMIN — GABAPENTIN 300 MG: 300 CAPSULE ORAL at 14:18

## 2020-03-02 RX ADMIN — GABAPENTIN 300 MG: 300 CAPSULE ORAL at 20:34

## 2020-03-02 RX ADMIN — ESCITALOPRAM 20 MG: 20 TABLET, FILM COATED ORAL at 11:10

## 2020-03-02 RX ADMIN — FAMOTIDINE 20 MG: 20 TABLET ORAL at 20:34

## 2020-03-02 RX ADMIN — ASPIRIN 81 MG 81 MG: 81 TABLET ORAL at 11:10

## 2020-03-02 RX ADMIN — INSULIN LISPRO 2 UNITS: 100 INJECTION, SOLUTION INTRAVENOUS; SUBCUTANEOUS at 12:37

## 2020-03-02 RX ADMIN — IPRATROPIUM BROMIDE AND ALBUTEROL SULFATE 1 AMPULE: .5; 3 SOLUTION RESPIRATORY (INHALATION) at 00:49

## 2020-03-02 RX ADMIN — Medication 11 MILLICURIE: at 08:34

## 2020-03-02 RX ADMIN — Medication 10 ML: at 20:34

## 2020-03-02 RX ADMIN — SODIUM POLYSTYRENE SULFONATE 15 G: 15 SUSPENSION ORAL; RECTAL at 11:10

## 2020-03-02 RX ADMIN — INSULIN LISPRO 1 UNITS: 100 INJECTION, SOLUTION INTRAVENOUS; SUBCUTANEOUS at 18:22

## 2020-03-02 RX ADMIN — IPRATROPIUM BROMIDE AND ALBUTEROL SULFATE 1 AMPULE: .5; 3 SOLUTION RESPIRATORY (INHALATION) at 14:46

## 2020-03-02 ASSESSMENT — PAIN DESCRIPTION - PAIN TYPE
TYPE: ACUTE PAIN
TYPE: ACUTE PAIN

## 2020-03-02 ASSESSMENT — PAIN DESCRIPTION - LOCATION
LOCATION: CHEST
LOCATION: CHEST

## 2020-03-02 ASSESSMENT — PAIN DESCRIPTION - ORIENTATION
ORIENTATION: MID
ORIENTATION: MID

## 2020-03-02 ASSESSMENT — PAIN DESCRIPTION - ONSET
ONSET: ON-GOING
ONSET: ON-GOING

## 2020-03-02 ASSESSMENT — PAIN - FUNCTIONAL ASSESSMENT
PAIN_FUNCTIONAL_ASSESSMENT: ACTIVITIES ARE NOT PREVENTED
PAIN_FUNCTIONAL_ASSESSMENT: ACTIVITIES ARE NOT PREVENTED

## 2020-03-02 ASSESSMENT — PAIN SCALES - GENERAL
PAINLEVEL_OUTOF10: 2
PAINLEVEL_OUTOF10: 0
PAINLEVEL_OUTOF10: 2

## 2020-03-02 ASSESSMENT — PAIN DESCRIPTION - FREQUENCY
FREQUENCY: INTERMITTENT
FREQUENCY: INTERMITTENT

## 2020-03-02 ASSESSMENT — PAIN DESCRIPTION - DESCRIPTORS
DESCRIPTORS: PRESSURE
DESCRIPTORS: PRESSURE

## 2020-03-02 ASSESSMENT — PAIN DESCRIPTION - PROGRESSION
CLINICAL_PROGRESSION: NOT CHANGED
CLINICAL_PROGRESSION: NOT CHANGED

## 2020-03-02 NOTE — PROGRESS NOTES
enoxaparin  40 mg Subcutaneous Daily   Subjective: Mentioned been having on and off chest pain recently, had a heart cath back in 2001 with no stents, has modified risk factors, denies any fever, chills, any other constitutional symptoms, nursing notes, labs, vitals reviewed. 3/2/20: Stress test positive, heart cath tomorrow. Vital Signs:   Vitals reviewed and compared with the previous ones  BP (!) 113/59   Pulse 77   Temp 97.8 °F (36.6 °C) (Oral)   Resp 18   Ht 5' 9\" (1.753 m)   Wt 209 lb 14.4 oz (95.2 kg)   SpO2 97%   BMI 31.00 kg/m²      Intake/Output Summary (Last 24 hours) at 3/2/2020 1702  Last data filed at 3/2/2020 1526  Gross per 24 hour   Intake 1655.54 ml   Output 0 ml   Net 1655.54 ml        General:   Age-appropriate, in no acute distress  HEENT:  normocephalic and atraumatic. No scleral icterus. PERRLA. Neck: supple. No thyromegaly. Lungs: clear to auscultation. No abnormal breathing sounds appreciated. Cardiac: S1, S2, RRR without murmur. No JVD. Abdomen: soft. Nontender. Bowel sounds positive. Extremities:  No clubbing, cyanosis, or edema in all extremities. Vasculature: capillary refill < 3 seconds. Pedal pulses intact bilaterally. Skin:  warm and dry. Not clammy. Psych:  Alert to time, person and place. Communicable. Affect appropriate  Lymph:  No supraclavicular ,and no anterior cervical lymphadenopathy. Neurologic:  No focal deficit. CN II-XII grossly intact. Motor and Sensory capacity intact in all extremities.     Labs:   Recent Labs     02/29/20  1803 03/01/20  0558 03/02/20  0626   WBC 5.6 4.1* 4.1*   HGB 12.4* 12.6* 12.3*   HCT 38.7* 40.0* 38.8*    211 192     Recent Labs     02/29/20  1803 03/01/20  0558 03/01/20  1835 03/02/20  0626    140  --  144   K 5.3* 5.4* 4.8 5.5*    106  --  109   CO2 28 28  --  24   BUN 14 11  --  12   CREATININE 0.8 0.8  --  0.9   CALCIUM 9.1 8.8  --  9.2     Recent Labs     02/29/20  1803   AST 12   ALT 18 BILITOT 0.3   ALKPHOS 34*     No results for input(s): INR in the last 72 hours. No results for input(s): [place] in the last 72 hours. Microbiology:    Blood culture #1: No results found for: BC    Blood culture #2:No results found for: Gaynel Quarry    Organism:No results found for: ORG    No results found for: LABGRAM    MRSA culture only:No results found for: [facility]    Urine culture: No results found for: LABURIN    Respiratory culture: No results found for: CULTRESP    Aerobic and Anaerobic :  No results found for: LABAERO  No results found for: LABANAE    Urinalysis:    No results found for: Mancel Usha, BACTERIA, RBCUA, BLOODU, SPECGRAV, GLUCOSEU    Radiology:  NM Cardiac Stress Test Nuclear Imaging- Treadmill   Final Result      XR CHEST STANDARD (2 VW)   Final Result   No acute disease. **This report has been created using voice recognition software. It may contain minor errors which are inherent in voice recognition technology. **      Final report electronically signed by Dr. Elian Aleman on 2/29/2020 5:59 PM        Xr Chest Standard (2 Vw)    Result Date: 2/29/2020  PROCEDURE: XR CHEST (2 VW) CLINICAL INFORMATION: cp dyspnea. COMPARISON: No prior study. TECHNIQUE: PA and lateral views the chest. FINDINGS: Heart size is normal. No pleural effusions. Mild dextroscoliosis. No acute infiltrate. No acute disease. **This report has been created using voice recognition software. It may contain minor errors which are inherent in voice recognition technology. ** Final report electronically signed by Dr. Elian Aleman on 2/29/2020 5:59 PM      Discussed plan with patient and family. Patient and family verbalized understanding and agree. All questions addressed with concerns. Please excuse my TYPOS!     Electronically signed by Rebecca Mayorga MD on 3/2/2020 at 5:02 PM

## 2020-03-03 ENCOUNTER — APPOINTMENT (OUTPATIENT)
Dept: CARDIAC CATH/INVASIVE PROCEDURES | Age: 60
DRG: 287 | End: 2020-03-03
Payer: COMMERCIAL

## 2020-03-03 LAB
ABO: NORMAL
ANION GAP SERPL CALCULATED.3IONS-SCNC: 12 MEQ/L (ref 8–16)
ANTIBODY SCREEN: NORMAL
BASOPHILS # BLD: 0.6 %
BASOPHILS ABSOLUTE: 0 THOU/MM3 (ref 0–0.1)
BUN BLDV-MCNC: 10 MG/DL (ref 7–22)
CALCIUM SERPL-MCNC: 9.3 MG/DL (ref 8.5–10.5)
CHLORIDE BLD-SCNC: 106 MEQ/L (ref 98–111)
CHOLESTEROL, TOTAL: 111 MG/DL (ref 100–199)
CO2: 24 MEQ/L (ref 23–33)
CREAT SERPL-MCNC: 0.9 MG/DL (ref 0.4–1.2)
EKG ATRIAL RATE: 66 BPM
EKG P AXIS: 42 DEGREES
EKG P-R INTERVAL: 120 MS
EKG Q-T INTERVAL: 404 MS
EKG QRS DURATION: 100 MS
EKG QTC CALCULATION (BAZETT): 423 MS
EKG R AXIS: 17 DEGREES
EKG T AXIS: 33 DEGREES
EKG VENTRICULAR RATE: 66 BPM
EOSINOPHIL # BLD: 2.5 %
EOSINOPHILS ABSOLUTE: 0.1 THOU/MM3 (ref 0–0.4)
ERYTHROCYTE [DISTWIDTH] IN BLOOD BY AUTOMATED COUNT: 13.3 % (ref 11.5–14.5)
ERYTHROCYTE [DISTWIDTH] IN BLOOD BY AUTOMATED COUNT: 48.7 FL (ref 35–45)
GFR SERPL CREATININE-BSD FRML MDRD: 86 ML/MIN/1.73M2
GLUCOSE BLD-MCNC: 116 MG/DL (ref 70–108)
GLUCOSE BLD-MCNC: 132 MG/DL (ref 70–108)
GLUCOSE BLD-MCNC: 142 MG/DL (ref 70–108)
GLUCOSE BLD-MCNC: 184 MG/DL (ref 70–108)
HCT VFR BLD CALC: 38.5 % (ref 42–52)
HDLC SERPL-MCNC: 33 MG/DL
HEMOGLOBIN: 12.4 GM/DL (ref 14–18)
IMMATURE GRANS (ABS): 0.01 THOU/MM3 (ref 0–0.07)
IMMATURE GRANULOCYTES: 0.3 %
INR BLD: 1.07 (ref 0.85–1.13)
LDL CHOLESTEROL CALCULATED: 45 MG/DL
LYMPHOCYTES # BLD: 36.2 %
LYMPHOCYTES ABSOLUTE: 1.3 THOU/MM3 (ref 1–4.8)
MCH RBC QN AUTO: 32.1 PG (ref 26–33)
MCHC RBC AUTO-ENTMCNC: 32.2 GM/DL (ref 32.2–35.5)
MCV RBC AUTO: 99.7 FL (ref 80–94)
MONOCYTES # BLD: 10.7 %
MONOCYTES ABSOLUTE: 0.4 THOU/MM3 (ref 0.4–1.3)
NUCLEATED RED BLOOD CELLS: 0 /100 WBC
PLATELET # BLD: 205 THOU/MM3 (ref 130–400)
PMV BLD AUTO: 9 FL (ref 9.4–12.4)
POTASSIUM REFLEX MAGNESIUM: 4.2 MEQ/L (ref 3.5–5.2)
RBC # BLD: 3.86 MILL/MM3 (ref 4.7–6.1)
RH FACTOR: NORMAL
SEG NEUTROPHILS: 49.7 %
SEGMENTED NEUTROPHILS ABSOLUTE COUNT: 1.8 THOU/MM3 (ref 1.8–7.7)
SODIUM BLD-SCNC: 142 MEQ/L (ref 135–145)
TRIGL SERPL-MCNC: 163 MG/DL (ref 0–199)
WBC # BLD: 3.6 THOU/MM3 (ref 4.8–10.8)

## 2020-03-03 PROCEDURE — 2709999900 HC NON-CHARGEABLE SUPPLY

## 2020-03-03 PROCEDURE — 2500000003 HC RX 250 WO HCPCS

## 2020-03-03 PROCEDURE — 6360000002 HC RX W HCPCS

## 2020-03-03 PROCEDURE — 6360000004 HC RX CONTRAST MEDICATION: Performed by: FAMILY MEDICINE

## 2020-03-03 PROCEDURE — 6370000000 HC RX 637 (ALT 250 FOR IP): Performed by: NURSE PRACTITIONER

## 2020-03-03 PROCEDURE — 85025 COMPLETE CBC W/AUTO DIFF WBC: CPT

## 2020-03-03 PROCEDURE — 6370000000 HC RX 637 (ALT 250 FOR IP): Performed by: FAMILY MEDICINE

## 2020-03-03 PROCEDURE — 85610 PROTHROMBIN TIME: CPT

## 2020-03-03 PROCEDURE — 99233 SBSQ HOSP IP/OBS HIGH 50: CPT | Performed by: FAMILY MEDICINE

## 2020-03-03 PROCEDURE — 99232 SBSQ HOSP IP/OBS MODERATE 35: CPT | Performed by: NURSE PRACTITIONER

## 2020-03-03 PROCEDURE — 93458 L HRT ARTERY/VENTRICLE ANGIO: CPT | Performed by: INTERNAL MEDICINE

## 2020-03-03 PROCEDURE — B2151ZZ FLUOROSCOPY OF LEFT HEART USING LOW OSMOLAR CONTRAST: ICD-10-PCS | Performed by: INTERNAL MEDICINE

## 2020-03-03 PROCEDURE — 4A023N7 MEASUREMENT OF CARDIAC SAMPLING AND PRESSURE, LEFT HEART, PERCUTANEOUS APPROACH: ICD-10-PCS | Performed by: INTERNAL MEDICINE

## 2020-03-03 PROCEDURE — 86900 BLOOD TYPING SEROLOGIC ABO: CPT

## 2020-03-03 PROCEDURE — 6370000000 HC RX 637 (ALT 250 FOR IP): Performed by: INTERNAL MEDICINE

## 2020-03-03 PROCEDURE — 94760 N-INVAS EAR/PLS OXIMETRY 1: CPT

## 2020-03-03 PROCEDURE — C1769 GUIDE WIRE: HCPCS

## 2020-03-03 PROCEDURE — 86850 RBC ANTIBODY SCREEN: CPT

## 2020-03-03 PROCEDURE — 86901 BLOOD TYPING SEROLOGIC RH(D): CPT

## 2020-03-03 PROCEDURE — 2580000003 HC RX 258: Performed by: NUCLEAR MEDICINE

## 2020-03-03 PROCEDURE — 80048 BASIC METABOLIC PNL TOTAL CA: CPT

## 2020-03-03 PROCEDURE — 36415 COLL VENOUS BLD VENIPUNCTURE: CPT

## 2020-03-03 PROCEDURE — 93005 ELECTROCARDIOGRAM TRACING: CPT | Performed by: NUCLEAR MEDICINE

## 2020-03-03 PROCEDURE — 1200000003 HC TELEMETRY R&B

## 2020-03-03 PROCEDURE — 80061 LIPID PANEL: CPT

## 2020-03-03 PROCEDURE — B2111ZZ FLUOROSCOPY OF MULTIPLE CORONARY ARTERIES USING LOW OSMOLAR CONTRAST: ICD-10-PCS | Performed by: INTERNAL MEDICINE

## 2020-03-03 PROCEDURE — 94640 AIRWAY INHALATION TREATMENT: CPT

## 2020-03-03 PROCEDURE — C1894 INTRO/SHEATH, NON-LASER: HCPCS

## 2020-03-03 PROCEDURE — 93010 ELECTROCARDIOGRAM REPORT: CPT | Performed by: NUCLEAR MEDICINE

## 2020-03-03 PROCEDURE — 82948 REAGENT STRIP/BLOOD GLUCOSE: CPT

## 2020-03-03 RX ORDER — SODIUM CHLORIDE 0.9 % (FLUSH) 0.9 %
10 SYRINGE (ML) INJECTION PRN
Status: DISCONTINUED | OUTPATIENT
Start: 2020-03-03 | End: 2020-03-03 | Stop reason: SDUPTHER

## 2020-03-03 RX ORDER — ACETAMINOPHEN 325 MG/1
650 TABLET ORAL EVERY 4 HOURS PRN
Status: DISCONTINUED | OUTPATIENT
Start: 2020-03-03 | End: 2020-03-03 | Stop reason: SDUPTHER

## 2020-03-03 RX ORDER — IPRATROPIUM BROMIDE AND ALBUTEROL SULFATE 2.5; .5 MG/3ML; MG/3ML
1 SOLUTION RESPIRATORY (INHALATION) EVERY 4 HOURS PRN
Status: DISCONTINUED | OUTPATIENT
Start: 2020-03-03 | End: 2020-03-04 | Stop reason: HOSPADM

## 2020-03-03 RX ORDER — SODIUM CHLORIDE 0.9 % (FLUSH) 0.9 %
10 SYRINGE (ML) INJECTION EVERY 12 HOURS SCHEDULED
Status: DISCONTINUED | OUTPATIENT
Start: 2020-03-03 | End: 2020-03-03 | Stop reason: SDUPTHER

## 2020-03-03 RX ORDER — ONDANSETRON 2 MG/ML
4 INJECTION INTRAMUSCULAR; INTRAVENOUS EVERY 6 HOURS PRN
Status: DISCONTINUED | OUTPATIENT
Start: 2020-03-03 | End: 2020-03-03 | Stop reason: SDUPTHER

## 2020-03-03 RX ORDER — NITROGLYCERIN 0.4 MG/1
0.4 TABLET SUBLINGUAL EVERY 5 MIN PRN
Status: DISCONTINUED | OUTPATIENT
Start: 2020-03-03 | End: 2020-03-03 | Stop reason: SDUPTHER

## 2020-03-03 RX ORDER — SODIUM CHLORIDE 9 MG/ML
INJECTION, SOLUTION INTRAVENOUS CONTINUOUS
Status: DISCONTINUED | OUTPATIENT
Start: 2020-03-03 | End: 2020-03-04

## 2020-03-03 RX ORDER — DIPHENHYDRAMINE HYDROCHLORIDE 50 MG/ML
25 INJECTION INTRAMUSCULAR; INTRAVENOUS ONCE
Status: DISCONTINUED | OUTPATIENT
Start: 2020-03-03 | End: 2020-03-03 | Stop reason: SDUPTHER

## 2020-03-03 RX ORDER — ASPIRIN 81 MG/1
324 TABLET, CHEWABLE ORAL ONCE
Status: DISCONTINUED | OUTPATIENT
Start: 2020-03-03 | End: 2020-03-04 | Stop reason: HOSPADM

## 2020-03-03 RX ORDER — DIAPER,BRIEF,INFANT-TODD,DISP
EACH MISCELLANEOUS 2 TIMES DAILY
Status: DISCONTINUED | OUTPATIENT
Start: 2020-03-03 | End: 2020-03-04 | Stop reason: HOSPADM

## 2020-03-03 RX ADMIN — METOPROLOL TARTRATE 25 MG: 25 TABLET ORAL at 13:31

## 2020-03-03 RX ADMIN — IPRATROPIUM BROMIDE AND ALBUTEROL SULFATE 1 AMPULE: .5; 3 SOLUTION RESPIRATORY (INHALATION) at 00:15

## 2020-03-03 RX ADMIN — FAMOTIDINE 20 MG: 20 TABLET ORAL at 09:31

## 2020-03-03 RX ADMIN — ASPIRIN 81 MG 81 MG: 81 TABLET ORAL at 06:20

## 2020-03-03 RX ADMIN — FAMOTIDINE 20 MG: 20 TABLET ORAL at 20:29

## 2020-03-03 RX ADMIN — METOPROLOL TARTRATE 25 MG: 25 TABLET ORAL at 20:18

## 2020-03-03 RX ADMIN — SODIUM CHLORIDE: 9 INJECTION, SOLUTION INTRAVENOUS at 04:46

## 2020-03-03 RX ADMIN — GABAPENTIN 300 MG: 300 CAPSULE ORAL at 13:25

## 2020-03-03 RX ADMIN — ATORVASTATIN CALCIUM 40 MG: 40 TABLET, FILM COATED ORAL at 20:18

## 2020-03-03 RX ADMIN — SODIUM CHLORIDE: 9 INJECTION, SOLUTION INTRAVENOUS at 18:40

## 2020-03-03 RX ADMIN — ISOSORBIDE MONONITRATE 60 MG: 60 TABLET ORAL at 09:31

## 2020-03-03 RX ADMIN — GABAPENTIN 300 MG: 300 CAPSULE ORAL at 09:31

## 2020-03-03 RX ADMIN — IPRATROPIUM BROMIDE AND ALBUTEROL SULFATE 1 AMPULE: .5; 3 SOLUTION RESPIRATORY (INHALATION) at 12:29

## 2020-03-03 RX ADMIN — IPRATROPIUM BROMIDE AND ALBUTEROL SULFATE 1 AMPULE: .5; 3 SOLUTION RESPIRATORY (INHALATION) at 06:59

## 2020-03-03 RX ADMIN — INSULIN LISPRO 1 UNITS: 100 INJECTION, SOLUTION INTRAVENOUS; SUBCUTANEOUS at 20:19

## 2020-03-03 RX ADMIN — IOPAMIDOL 60 ML: 755 INJECTION, SOLUTION INTRAVENOUS at 15:02

## 2020-03-03 RX ADMIN — HYDROCORTISONE: 1 CREAM TOPICAL at 13:25

## 2020-03-03 RX ADMIN — ESCITALOPRAM 20 MG: 20 TABLET, FILM COATED ORAL at 09:31

## 2020-03-03 RX ADMIN — GABAPENTIN 300 MG: 300 CAPSULE ORAL at 20:18

## 2020-03-03 RX ADMIN — HYDROCORTISONE: 1 CREAM TOPICAL at 20:19

## 2020-03-03 ASSESSMENT — PAIN DESCRIPTION - FREQUENCY: FREQUENCY: INTERMITTENT

## 2020-03-03 ASSESSMENT — PAIN SCALES - GENERAL
PAINLEVEL_OUTOF10: 0
PAINLEVEL_OUTOF10: 0
PAINLEVEL_OUTOF10: 2

## 2020-03-03 ASSESSMENT — PAIN DESCRIPTION - LOCATION: LOCATION: CHEST

## 2020-03-03 ASSESSMENT — PAIN DESCRIPTION - ORIENTATION: ORIENTATION: MID

## 2020-03-03 ASSESSMENT — PAIN DESCRIPTION - PROGRESSION
CLINICAL_PROGRESSION: NOT CHANGED
CLINICAL_PROGRESSION: NOT CHANGED

## 2020-03-03 ASSESSMENT — PAIN DESCRIPTION - DESCRIPTORS: DESCRIPTORS: PRESSURE

## 2020-03-03 ASSESSMENT — PAIN DESCRIPTION - ONSET: ONSET: ON-GOING

## 2020-03-03 ASSESSMENT — PAIN - FUNCTIONAL ASSESSMENT: PAIN_FUNCTIONAL_ASSESSMENT: ACTIVITIES ARE NOT PREVENTED

## 2020-03-03 ASSESSMENT — PAIN DESCRIPTION - PAIN TYPE: TYPE: ACUTE PAIN

## 2020-03-03 NOTE — PROGRESS NOTES
Hospitalist Progress Note      Patient:  Juwan Cantu    Unit/Bed:8B-32/032-A  YOB: 1960  MRN: [id number]   Acct: [de-identified]   PCP: Jessica Michaud MD  Date of Admission: 2/29/2020    Assessment and Plan:        1. Atypical chest pain likely secondary to unstable angina: Received nitro sublingual in the ER which relieved his pain. EKG on admission was normal, 3 sets of tropes with BMP all normal, cardiology consulted, we will order Lexiscan stress test in a.m. and ECHO. Updates:  3/2/20: Nuclear stress test positive for ischemia, patient scheduled for heart cath as of 3/3/2020. N.p.o. midnight. 3/3/20: Awaiting for left heart cath afternoon, echocardiogram 55% EF otherwise normal.  2. CAD status post left heart cath 2001 with no stent: Continue ASA, increase lipid-lowering therapy to high intensity using Lipitor 40 mg daily, ACE inhibitor, patient blood pressure does not allow adding beta-blocker and he is sometimes bradycardic. Consider discontinue Actos because of cardiovascular event side effect. 3. Unstable angina: Abnormal EKG, normal tropes and BNP, positive stress test for ischemia, scheduled for left heart cath 3/3/2020. Consider discontinue Actos because of cardiovascular side effect. Awaiting for heart cath 3/3/2020.  4. Hyperkalemia: Decrease lisinopril to 10 mg instead of 20 mg, EKG normal, give 2 DuoNeb's and monitor closely, repeat potassium level later today. Updates:  3/2/20: We will hold lisinopril because continuous hyperkalemia, we will order 1 dose of Kayexalate, repeat potassium level at 7 PM.  5. Essential hypertension: Decrease lisinopril dose to 10 mg daily instead of 20 because of hyperkalemia  3/2/20:  We will hold lisinopril because of hyperkalemia, hydralazine 5 mg every 6 hours as needed for systolic blood pressure more than 170.  6. Dyslipidemia: Switch simvastatin 20 mg nightly to high intensity lipid-lowering therapy Lipitor 40 mg for secondary protection from CVAs and CVEs  7. Type 2 diabetes controlled: Last A1c 7 as of 2/29/2020, continue Actos, GLP-1 agonist, continue sliding scale, continue AC/at bedtime blood sugar checks, diabetic diet, monitor closely. 8. Depression: Continue Lexapro 20 mg daily  9. Diabetic neuropathy: Continue gabapentin 300 mg 3 times daily  10. Disposition: awaiting for heart cath 3/3/2020. Also we will keep overnight monitor kidney function after the heart cath, if normal as of 3/4/2020 then discharge home. PMH, PSH, SH, FHX reviewed in chart review snap shot in EPIC    CC: Atypical chest pain  HPI: Initial admission HPI reviewed as below:  69-year-old male presents to the emergency department for evaluation of chest pain shortness of breath. Chest pain is been intermittent for the last month his current pain is 1 out of 10 in severity. He describes his chest pain as heaviness in character shortness of breath with and without exertion. He has been evaluated by his PCP was told to have a stress test performed and follow-up with cardiology. He denies fever chills nausea or vomiting denies constipation or diarrhea patient has reports that his smoking history of 3 packs/day but quit in 1997 he has a past medical history of hypertension hyperlipidemia  For further details and updates please refer to assessment and plan at the beginning of the note. ROS (10 point review of systems completed. Pertinent positives noted.  Otherwise ROS is negative) : chest pain  PMH:  Per HPI and reviewed in the chart  SHX: Reviewed in the chart, also the patient  [id number] [facility]: Reviewed in the chart, also with the patient  Allergies: Reviewed in the chart  Medications:     sodium chloride 75 mL/hr at 03/03/20 0446    dextrose        diphenhydrAMINE  25 mg Intravenous Once    hydrocortisone sodium succinate PF  100 mg Intravenous Once    aspirin  324 mg Oral Once    metoprolol tartrate  25 mg Oral BID    hydrocortisone   Topical BID    Sensory capacity intact in all extremities. Labs:   Recent Labs     03/01/20  0558 03/02/20  0626 03/03/20  0532   WBC 4.1* 4.1* 3.6*   HGB 12.6* 12.3* 12.4*   HCT 40.0* 38.8* 38.5*    192 205     Recent Labs     03/01/20  0558  03/02/20  0626 03/02/20  1923 03/03/20  0532     --  144  --  142   K 5.4*   < > 5.5* 4.5 4.2     --  109  --  106   CO2 28  --  24  --  24   BUN 11  --  12  --  10   CREATININE 0.8  --  0.9  --  0.9   CALCIUM 8.8  --  9.2  --  9.3    < > = values in this interval not displayed. Recent Labs     02/29/20  1803   AST 12   ALT 18   BILITOT 0.3   ALKPHOS 34*     Recent Labs     03/03/20  0532   INR 1.07     No results for input(s): Dylan Andrade in the last 72 hours. Microbiology:    Blood culture #1: No results found for: BC    Blood culture #2:No results found for: Leonce Meals    Organism:No results found for: ORG    No results found for: LABGRAM    MRSA culture only:No results found for: [address] [place] [address]    Urine culture: No results found for: LABURIN    Respiratory culture: No results found for: CULTRESP    Aerobic and Anaerobic :  No results found for: LABAERO  No results found for: LABANAE    Urinalysis:    No results found for: Isha Gray, BACTERIA, RBCUA, BLOODU, SPECGRAV, GLUCOSEU    Radiology:  NM Cardiac Stress Test Nuclear Imaging- Treadmill   Final Result      XR CHEST STANDARD (2 VW)   Final Result   No acute disease. **This report has been created using voice recognition software. It may contain minor errors which are inherent in voice recognition technology. **      Final report electronically signed by Dr. Sujatha Candelaria on 2/29/2020 5:59 PM        Xr Chest Standard (2 Vw)    Result Date: 2/29/2020  PROCEDURE: XR CHEST (2 VW) CLINICAL INFORMATION: cp dyspnea. COMPARISON: No prior study. TECHNIQUE: PA and lateral views the chest. FINDINGS: Heart size is normal. No pleural effusions. Mild dextroscoliosis. No acute infiltrate.      No acute

## 2020-03-03 NOTE — CARE COORDINATION
3/3/20, 11:56 AM    DISCHARGE ONGOING EVALUATION:     Bandar Abdi day: 0  Location: [facility]32/032-A Reason for admit: Chest pain [R07.9]  Unstable angina ([address] [I20.0]   Treatment Plan of Care: Abnormal Stress Test yesterday. Cardiac Cath planned for today. IVF at 75/hr. Breathing treatments. Barriers to Discharge:  Readiness.   PCP: Alida Basilio MD  Readmission Risk Score: 15%

## 2020-03-03 NOTE — OP NOTE
[address]  Sedation/Analgesia Post Sedation Record        Pt Name: Yara Maria  MRN: [id number]  YOB: 1960  Procedure Performed By: iM Rae MD, Stacy Ríos, [address]  Primary Care Physician: Daniel Rico MD        POST-PROCEDURE    Physicians/Assistants: Mi Rae MD, Stacy Ríos, BERNARDOVI    Procedure Performed:  Left heart cath                                  Sedation/Anesthesia:  Local Anesthesia and IV Conscious Sedation with continuous O2 monitoring    Estimated Blood Loss: 10 cc     Specimens Removed:  [x]None []Other:      Disposition of Specimen:  []Pathology []Other        Complications:   [x]None Immediate []Other:       Post Procedure Diagnosis/Findings: Patent coronaries         Recommendations:  Medical treatment and review films.                Mi Rae MD, Stacy Ríos, [address]  Electronically signed 3/3/2020 at 3:03 PM

## 2020-03-03 NOTE — PLAN OF CARE
Problem: Discharge Planning:  Goal: Discharged to appropriate level of care  Description  Discharged to appropriate level of care  Outcome: Ongoing  Note:   Patient plans to discharge home once heart cath is completed. Problem: Cardiac Output - Decreased:  Goal: Hemodynamic stability will improve  Description  Hemodynamic stability will improve  Outcome: Ongoing  Note:   Patient will remain hemodynamically stable. Blood pressure stable and within normal range. Normal sinus on the telemetry monitor. Problem: Serum Glucose Level - Abnormal:  Goal: Ability to maintain appropriate glucose levels will improve  Description  Ability to maintain appropriate glucose levels will improve  Outcome: Ongoing  Note:   Chems checked ACHS. Sliding scale insulin administered as ordered when needed. 1 unit of insulin given. Problem: Tissue Perfusion - Cardiopulmonary, Altered:  Goal: Absence of angina  Description  Absence of angina  Outcome: Ongoing  Note:   Patient complains of 2/10 chest pain, patient encouraged to rest and reposition. Problem: Tissue Perfusion - Cardiopulmonary, Altered:  Goal: Circulatory function within specified parameters  Description  Circulatory function within specified parameters  Outcome: Ongoing  Note:   Pedal and radial pulses present. Capillary refill <3 seconds. Skin pink and warm. Problem: Tissue Perfusion - Cardiopulmonary, Altered:  Goal: Hemodynamic stability will improve  Description  Hemodynamic stability will improve  Outcome: Ongoing  Note:   Patient will remain hemodynamically stable. Blood pressure stable and within normal range. Problem: Pain:  Goal: Pain level will decrease  Description  Pain level will decrease  Outcome: Ongoing  Note:   Patient will have decrease pain. Patient will rate pain on a 0-10 scale. Non-pharmaceutical pain interventions will be used before medications.         Problem: Pain:  Goal: Control of acute pain  Description  Control of acute pain  Outcome: Ongoing  Note:   Patient complains of 2/10 chest pain, patient encouraged to rest and reposition. Problem: Pain:  Goal: Control of chronic pain  Description  Control of chronic pain  Outcome: Ongoing  Note:   Patient denies any chronic pain. Care plan reviewed with patient, no questions or concerns at this time.

## 2020-03-03 NOTE — PLAN OF CARE
Problem: Impaired respiratory status  Goal: Clear lung sounds  Description  Clear lung sounds     3/3/2020 0017 by Augustine Monzon RCP  Outcome: Met This Shift  Note:   Duoneb given as ordered. Breath sounds are clear t/o. Continue to maintain clear lung sounds. Purpose and side effects of medication explained. Pt had no questions.

## 2020-03-03 NOTE — PROGRESS NOTES
Cardiology Progress Note      Patient:  Frannie Moreno  YOB: 1960  MRN: [id number]   Acct: [de-identified]  [facility] Date:  2/29/2020  Primary Cardiologist: none  Seen by Dr. Conrad Newton    Per prior cardiology consult note-  REASON FOR CONSULTATION:  Chest pain and shortness of breath.     REQUESTING PROVIDER:  Hospitalist Service.     HISTORY OF PRESENT ILLNESS:  This is a pleasant 68-year-old gentleman  who apparently has multiple risk factors for coronary artery disease  including diabetes, hypertension, hyperlipidemia, family history of  coronary artery disease; who has been having symptoms of chest heaviness  and tightness with shortness of breath for the last month or so. The  patient's symptoms have been intermittent, both exertional and resting,  more so exertional, some radiation to left upper extremity. He was seen  by his family physician and was supposed to have evaluation as  outpatient; however, presented for further assessment. So far, workup  has revealed no obvious acute myocardial infarct. EKG and cardiac  enzymes did not reveal any specific findings. The patient has had a  cardiac cath several years ago and was reported to be within acceptable  limits. He does have risk factors as described above.   The patient has  been asymptomatic since admission.         Subjective (Events in last 24 hours):     Pt up in chair   He states collateral circulation from cath years ago   More SOB and constant chest pressure lately   No palpitations or dizziness     VSS  Tele SR no ectopy    Objective:   /60   Pulse 75   Temp 97.9 °F (36.6 °C) (Oral)   Resp 16   Ht 5' 9\" (1.753 m)   Wt 207 lb 6.4 oz (94.1 kg)   SpO2 96%   BMI 30.63 kg/m²        TELEMETRY: SR    Physical Exam:  General Appearance: alert and oriented to person, place and time, in no acute distress  Cardiovascular: normal rate, regular rhythm, normal S1 and S2, no murmurs, rubs, clicks, or gallops, distal pulses intact, Pulmonary/Chest: clear to auscultation bilaterally- no wheezes, rales or rhonchi, normal air movement, no respiratory distress  Abdomen: soft, non-tender, non-distended, normal bowel sounds, no masses Extremities: no cyanosis, clubbing or edema, pulses present   Skin: warm and dry, no rash or erythema  Head: normocephalic and atraumatic   Musculoskeletal: normal range of motion, no joint swelling, deformity or tenderness  Neurological: alert, oriented, normal speech, no focal findings or movement disorder noted    Medications:    diphenhydrAMINE  25 mg Intravenous Once    hydrocortisone sodium succinate PF  100 mg Intravenous Once    aspirin  324 mg Oral Once    ipratropium-albuterol  1 ampule Inhalation Q6H    [Held by provider] lisinopril  10 mg Oral Daily    atorvastatin  40 mg Oral Nightly    sodium chloride flush  10 mL Intravenous 2 times per day    polyethylene glycol  17 g Oral Daily    famotidine  20 mg Oral BID    aspirin  81 mg Oral Daily    escitalopram  20 mg Oral Daily    gabapentin  300 mg Oral TID    isosorbide mononitrate  60 mg Oral Daily    insulin lispro  0-6 Units Subcutaneous TID WC    insulin lispro  0-3 Units Subcutaneous Nightly    enoxaparin  40 mg Subcutaneous Daily      sodium chloride 75 mL/hr at 20 0446    dextrose       hydrALAZINE, 5 mg, Q6H PRN  regadenoson, 0.4 mg, ONCE PRN  nitroGLYCERIN, 0.4 mg, Q5 Min PRN  sodium chloride flush, 10 mL, PRN  acetaminophen, 650 mg, Q6H PRN    Or  acetaminophen, 650 mg, Q6H PRN  promethazine, 12.5 mg, Q6H PRN    Or  ondansetron, 4 mg, Q6H PRN  glucose, 15 g, PRN  dextrose, 12.5 g, PRN  glucagon (rDNA), 1 mg, PRN  dextrose, 100 mL/hr, PRN        Diagnostics:  EK-MAR-2020 06:10:07 [facility] ROUTINE RETRIEVAL  Normal sinus rhythm  Normal ECG  When compared with ECG of 01-MAR-2020 05:03,  No significant change was found  Confirmed by Dalphine Buerger ([id number]) on 3/3/2020 8:48:05 AM    Echo:

## 2020-03-04 ENCOUNTER — APPOINTMENT (OUTPATIENT)
Dept: CT IMAGING | Age: 60
DRG: 287 | End: 2020-03-04
Payer: COMMERCIAL

## 2020-03-04 VITALS
HEIGHT: 69 IN | TEMPERATURE: 98.2 F | HEART RATE: 64 BPM | OXYGEN SATURATION: 95 % | RESPIRATION RATE: 16 BRPM | WEIGHT: 207.4 LBS | BODY MASS INDEX: 30.72 KG/M2 | SYSTOLIC BLOOD PRESSURE: 123 MMHG | DIASTOLIC BLOOD PRESSURE: 69 MMHG

## 2020-03-04 PROBLEM — R07.89 ATYPICAL CHEST PAIN: Status: ACTIVE | Noted: 2020-02-29

## 2020-03-04 LAB
ANION GAP SERPL CALCULATED.3IONS-SCNC: 13 MEQ/L (ref 8–16)
BASOPHILS # BLD: 1 %
BASOPHILS ABSOLUTE: 0 THOU/MM3 (ref 0–0.1)
BUN BLDV-MCNC: 12 MG/DL (ref 7–22)
CALCIUM SERPL-MCNC: 9.2 MG/DL (ref 8.5–10.5)
CHLORIDE BLD-SCNC: 108 MEQ/L (ref 98–111)
CO2: 24 MEQ/L (ref 23–33)
CREAT SERPL-MCNC: 0.8 MG/DL (ref 0.4–1.2)
D-DIMER QUANTITATIVE: 311 NG/ML FEU (ref 0–500)
EOSINOPHIL # BLD: 2.4 %
EOSINOPHILS ABSOLUTE: 0.1 THOU/MM3 (ref 0–0.4)
ERYTHROCYTE [DISTWIDTH] IN BLOOD BY AUTOMATED COUNT: 13.1 % (ref 11.5–14.5)
ERYTHROCYTE [DISTWIDTH] IN BLOOD BY AUTOMATED COUNT: 48.9 FL (ref 35–45)
FOLATE: 14.1 NG/ML (ref 4.8–24.2)
GFR SERPL CREATININE-BSD FRML MDRD: > 90 ML/MIN/1.73M2
GLUCOSE BLD-MCNC: 119 MG/DL (ref 70–108)
GLUCOSE BLD-MCNC: 127 MG/DL (ref 70–108)
GLUCOSE BLD-MCNC: 130 MG/DL (ref 70–108)
HCT VFR BLD CALC: 40.3 % (ref 42–52)
HEMOGLOBIN: 12.7 GM/DL (ref 14–18)
IMMATURE GRANS (ABS): 0.02 THOU/MM3 (ref 0–0.07)
IMMATURE GRANULOCYTES: 0.5 %
LYMPHOCYTES # BLD: 27.8 %
LYMPHOCYTES ABSOLUTE: 1.2 THOU/MM3 (ref 1–4.8)
MCH RBC QN AUTO: 31.8 PG (ref 26–33)
MCHC RBC AUTO-ENTMCNC: 31.5 GM/DL (ref 32.2–35.5)
MCV RBC AUTO: 100.8 FL (ref 80–94)
MONOCYTES # BLD: 9.1 %
MONOCYTES ABSOLUTE: 0.4 THOU/MM3 (ref 0.4–1.3)
NUCLEATED RED BLOOD CELLS: 0 /100 WBC
PLATELET # BLD: 206 THOU/MM3 (ref 130–400)
PMV BLD AUTO: 9.1 FL (ref 9.4–12.4)
POTASSIUM SERPL-SCNC: 5.1 MEQ/L (ref 3.5–5.2)
RBC # BLD: 4 MILL/MM3 (ref 4.7–6.1)
SEG NEUTROPHILS: 59.2 %
SEGMENTED NEUTROPHILS ABSOLUTE COUNT: 2.5 THOU/MM3 (ref 1.8–7.7)
SODIUM BLD-SCNC: 145 MEQ/L (ref 135–145)
TSH SERPL DL<=0.05 MIU/L-ACNC: 2.39 UIU/ML (ref 0.4–4.2)
VITAMIN B-12: 246 PG/ML (ref 211–911)
WBC # BLD: 4.2 THOU/MM3 (ref 4.8–10.8)

## 2020-03-04 PROCEDURE — 36415 COLL VENOUS BLD VENIPUNCTURE: CPT

## 2020-03-04 PROCEDURE — 99232 SBSQ HOSP IP/OBS MODERATE 35: CPT | Performed by: NURSE PRACTITIONER

## 2020-03-04 PROCEDURE — 2580000003 HC RX 258: Performed by: INTERNAL MEDICINE

## 2020-03-04 PROCEDURE — 6370000000 HC RX 637 (ALT 250 FOR IP): Performed by: NURSE PRACTITIONER

## 2020-03-04 PROCEDURE — 6370000000 HC RX 637 (ALT 250 FOR IP): Performed by: INTERNAL MEDICINE

## 2020-03-04 PROCEDURE — 85379 FIBRIN DEGRADATION QUANT: CPT

## 2020-03-04 PROCEDURE — 71260 CT THORAX DX C+: CPT

## 2020-03-04 PROCEDURE — 80048 BASIC METABOLIC PNL TOTAL CA: CPT

## 2020-03-04 PROCEDURE — 84443 ASSAY THYROID STIM HORMONE: CPT

## 2020-03-04 PROCEDURE — 6360000004 HC RX CONTRAST MEDICATION: Performed by: INTERNAL MEDICINE

## 2020-03-04 PROCEDURE — 85025 COMPLETE CBC W/AUTO DIFF WBC: CPT

## 2020-03-04 PROCEDURE — 94760 N-INVAS EAR/PLS OXIMETRY 1: CPT

## 2020-03-04 PROCEDURE — 82746 ASSAY OF FOLIC ACID SERUM: CPT

## 2020-03-04 PROCEDURE — 99239 HOSP IP/OBS DSCHRG MGMT >30: CPT | Performed by: INTERNAL MEDICINE

## 2020-03-04 PROCEDURE — 82607 VITAMIN B-12: CPT

## 2020-03-04 PROCEDURE — 82948 REAGENT STRIP/BLOOD GLUCOSE: CPT

## 2020-03-04 RX ORDER — NITROGLYCERIN 0.4 MG/1
TABLET SUBLINGUAL
Qty: 25 TABLET | Refills: 3 | Status: SHIPPED | OUTPATIENT
Start: 2020-03-04

## 2020-03-04 RX ORDER — DIAPER,BRIEF,INFANT-TODD,DISP
EACH MISCELLANEOUS
Qty: 1 TUBE | Refills: 1 | Status: SHIPPED | OUTPATIENT
Start: 2020-03-04 | End: 2020-03-11

## 2020-03-04 RX ORDER — POLYETHYLENE GLYCOL 3350 17 G/17G
17 POWDER, FOR SOLUTION ORAL DAILY PRN
Qty: 30 EACH | Refills: 0 | Status: SHIPPED | OUTPATIENT
Start: 2020-03-04 | End: 2020-03-10

## 2020-03-04 RX ORDER — FAMOTIDINE 20 MG/1
20 TABLET, FILM COATED ORAL 2 TIMES DAILY
Qty: 60 TABLET | Refills: 3 | Status: SHIPPED | OUTPATIENT
Start: 2020-03-04

## 2020-03-04 RX ORDER — ALBUTEROL SULFATE 90 UG/1
2 AEROSOL, METERED RESPIRATORY (INHALATION) 4 TIMES DAILY PRN
Qty: 1 INHALER | Refills: 0 | Status: SHIPPED | OUTPATIENT
Start: 2020-03-04

## 2020-03-04 RX ADMIN — ASPIRIN 81 MG 81 MG: 81 TABLET ORAL at 08:23

## 2020-03-04 RX ADMIN — HYDROCORTISONE: 1 CREAM TOPICAL at 08:23

## 2020-03-04 RX ADMIN — ESCITALOPRAM 20 MG: 20 TABLET, FILM COATED ORAL at 08:23

## 2020-03-04 RX ADMIN — ISOSORBIDE MONONITRATE 60 MG: 60 TABLET ORAL at 08:23

## 2020-03-04 RX ADMIN — IOPAMIDOL 80 ML: 755 INJECTION, SOLUTION INTRAVENOUS at 13:27

## 2020-03-04 RX ADMIN — Medication 10 ML: at 08:23

## 2020-03-04 RX ADMIN — METOPROLOL TARTRATE 25 MG: 25 TABLET ORAL at 08:23

## 2020-03-04 RX ADMIN — FAMOTIDINE 20 MG: 20 TABLET ORAL at 08:23

## 2020-03-04 RX ADMIN — GABAPENTIN 300 MG: 300 CAPSULE ORAL at 14:14

## 2020-03-04 RX ADMIN — GABAPENTIN 300 MG: 300 CAPSULE ORAL at 08:23

## 2020-03-04 ASSESSMENT — PAIN SCALES - GENERAL
PAINLEVEL_OUTOF10: 0

## 2020-03-04 NOTE — PROGRESS NOTES
Cardiology Progress Note      Patient:  Radha Vizcaino  YOB: 1960  MRN: [id number]   Acct: [de-identified]  Yosef Herrera Date:  2/29/2020  Primary Cardiologist: Seen by Dr. Caty Barros    Per Dr. Alexander Bernard consult note:     REASON FOR CONSULTATION:  Chest pain and shortness of breath.     REQUESTING PROVIDER:  Hospitalist Service.     HISTORY OF PRESENT ILLNESS:  This is a pleasant 59-year-old gentleman  who apparently has multiple risk factors for coronary artery disease  including diabetes, hypertension, hyperlipidemia, family history of  coronary artery disease; who has been having symptoms of chest heaviness  and tightness with shortness of breath for the last month or so. The  patient's symptoms have been intermittent, both exertional and resting,  more so exertional, some radiation to left upper extremity. He was seen  by his family physician and was supposed to have evaluation as  outpatient; however, presented for further assessment. So far, workup  has revealed no obvious acute myocardial infarct. EKG and cardiac  enzymes did not reveal any specific findings. The patient has had a  cardiac cath several years ago and was reported to be within acceptable  limits. He does have risk factors as described above. The patient has  been asymptomatic since admission.     Subjective (Events in last 24 hours): up in chair in nad. Denies chest pain or sob. Has ambulated in dee without any complaints. Wants to go home. Right radial cath site soft without pain, bleeding, drainage, redness, or warmth. Right upper Extremity with normal color / temperature, evident movement / sensation, and pulses present.         Objective:   /83   Pulse 70   Temp 97.9 °F (36.6 °C) (Oral)   Resp 16   Ht 5' 9\" (1.753 m)   Wt 207 lb 6.4 oz (94.1 kg)   SpO2 93%   BMI 30.63 kg/m²        TELEMETRY: SR    Physical Exam:  General Appearance: alert and oriented to person, place and time, in no acute distress  Cardiovascular: normal rate, regular rhythm, normal S1 and S2, no murmurs, rubs, clicks, or gallops, distal pulses intact, no carotid bruits, no JVD  Pulmonary/Chest: clear to auscultation bilaterally- no wheezes, rales or rhonchi, normal air movement, no respiratory distress  Abdomen: soft, non-tender, non-distended, normal bowel sounds, no masses   Extremities: no cyanosis, clubbing or edema, pulses palpable   Right radial cath site soft without pain, bleeding, drainage, redness, or warmth. Right upper Extremity with normal color / temperature, evident movement / sensation, and pulses present.     Skin: warm and dry, no rash or erythema  Head: normocephalic and atraumatic  Eyes: pupils equal, round, and reactive to light  Neck: supple and non-tender without mass, no thyromegaly   Musculoskeletal: normal range of motion, no joint swelling, deformity or tenderness  Neurological: alert, oriented, normal speech, no focal findings or movement disorder noted    Medications:    aspirin  324 mg Oral Once    metoprolol tartrate  25 mg Oral BID    hydrocortisone   Topical BID    [Held by provider] lisinopril  10 mg Oral Daily    atorvastatin  40 mg Oral Nightly    sodium chloride flush  10 mL Intravenous 2 times per day    polyethylene glycol  17 g Oral Daily    famotidine  20 mg Oral BID    aspirin  81 mg Oral Daily    escitalopram  20 mg Oral Daily    gabapentin  300 mg Oral TID    isosorbide mononitrate  60 mg Oral Daily    insulin lispro  0-6 Units Subcutaneous TID WC    insulin lispro  0-3 Units Subcutaneous Nightly    enoxaparin  40 mg Subcutaneous Daily      sodium chloride Stopped (03/04/20 0817)    dextrose       ipratropium-albuterol, 1 ampule, Q4H PRN  magnesium hydroxide, 30 mL, Daily PRN  hydrALAZINE, 5 mg, Q6H PRN  regadenoson, 0.4 mg, ONCE PRN  nitroGLYCERIN, 0.4 mg, Q5 Min PRN  sodium chloride flush, 10 mL, PRN  acetaminophen, 650 mg, Q6H PRN    Or  acetaminophen, 650 mg, Q6H PRN  promethazine, 12.5 mg, Q6H PRN radial artery was achieved  with a Vasc band device.     SUMMARY:  1. Nonobstructive coronary artery disease. 2.  Normal LV systolic function.     RECOMMENDATIONS:  1. Aggressive risk factor modification. 2.  Lipid lowering therapy. 3.  Follow up in clinic in one to two weeks with primary cardiologist or  myself to evaluate symptoms further.           Elder Fraga MD  Lab Data:    Cardiac Enzymes:  No results for input(s): CKTOTAL, CKMB, CKMBINDEX, TROPONINI in the last 72 hours.     CBC:   Lab Results   Component Value Date    WBC 4.2 03/04/2020    RBC 4.00 03/04/2020    HGB 12.7 03/04/2020    HCT 40.3 03/04/2020     03/04/2020       CMP:    Lab Results   Component Value Date     03/04/2020    K 5.1 03/04/2020    K 4.2 03/03/2020     03/04/2020    CO2 24 03/04/2020    BUN 12 03/04/2020    CREATININE 0.8 03/04/2020    LABGLOM >90 03/04/2020    GLUCOSE 127 03/04/2020    CALCIUM 9.2 03/04/2020       Hepatic Function Panel:    Lab Results   Component Value Date    ALKPHOS 34 02/29/2020    ALT 18 02/29/2020    AST 12 02/29/2020    PROT 6.0 02/29/2020    BILITOT 0.3 02/29/2020    LABALBU 3.7 02/29/2020       Magnesium:  No results found for: MG    PT/INR:    Lab Results   Component Value Date    INR 1.07 03/03/2020       HgBA1c:    Lab Results   Component Value Date    LABA1C 7.0 03/01/2020       FLP:    Lab Results   Component Value Date    TRIG 163 03/03/2020    HDL 33 03/03/2020    LDLCALC 45 03/03/2020       TSH:  No results found for: TSH      Assessment:  · Chest pain, sob: resolved   Known nonobstructive CAD in past, Abnormal stress test, [facility] 3/3/20 with nonobstructive CAD, normal LV function  ·  EF 55 per echo 3/2/30  · HTN  · HLP  · DM type 2  · Anxiety      Plan:  · [id number] [address] for discharge from cardiology standpoint  · Continue asa, statin, lopressor  · F/U in cardiology clinic in 2-3 weeks         Electronically signed by CHRIS Tipton CNP on 3/4/2020 at 9:33 AM

## 2020-03-04 NOTE — DISCHARGE SUMMARY
Hospital Medicine Discharge Summary      Patient:  Robin Starr  YOB: 1960  MRN: [id number]   PCP: Tessa Harrison MD         Acct: [de-identified]     Admit Date: 2/29/2020     Discharge Date:  3/4/2020       Admitting Physician: [place] Yo DO  Discharge Physician: James Lyman MD     Discharge Diagnoses     DISCHARGE DIAGNOSES:  1. Atypical chest pain/abnormal stress test with findings of non obstructive coronary artery disease  2. Dyspnea on exertion  3. Macrocytic anemia: Vitamin B12, folate, TSH ordered on day of discharge  4. Essential HTN: Lisinopril held secondary to hyperkalemia  5. DM Type 2, controlled complicated by peripheral neuropathy: Continue Actos  6. Chronic depression: Lexapro   7. Dyslipidemia: simvastatin      Disposition:    [x] Home      Condition at Discharge: Stable    The patient was seen and examined on day of discharge and this discharge summary is in conjunction with any daily progress note from day of discharge. Hospital course     Robin Starr is a 60 y.o. male with a history of essential HTN and previous tobacco dependence. He was admitted to [address] [place] [address] on 2/29/2020 for atypical chest pressure associated with SOB over a period of 2 - 4 weeks . On the day of admission, the patient was afebrile with leukocytosis. Troponin was unremarkable x3. EKG showed normal sinus rhythm without evidence for ischemia. CXR was unremarkable for an acute process. His pain improved with the administration of nitroglycerin. A stress test was obtained and was abnormal with findings suggestive of inferior wall ischemia, however subsequent cardiac catheterization showed non-obstructive CAD with 40-50% focal stenosis of the mid left circumflex and 30-40% distal stenosis of the mid left circumflex. Echocardiogram showed an EF of 55% with no wall motion abnormalities. The patient was continued on aspirin, atorvastatin, metoprolol and isosorbide mononitrate.  A subsequent CT scan of the chest w/contrast did not show evidence for pneumonia, or masses. There were ground glass opacities in the right lower lobe and the patient was advised to self monitor for symptoms of cough or worsening bronchitis. He was additionally discharged with an albuterol inhaler for reported symptoms of dyspnea on exertion and it is recommended that he follow up with a pulmonologist as an outpatient for full PFTS. His chest pressure was improving at the time of discharge. Lab tests also showed hyperkalemia to 5.5 and the patient reported that he recently changed his diet to include bananas and tomatoes. He agreed to cut back on these. Lisinopril was discontinued and he was started on metoprolol A BMP is recommended in 1 week. His discharge potassium level was 5.1. He was also noted to have macrocytic anemia and  Vitamin B12, folate and TSH were ordered at the time of discharge. Discharge Medications      Dl Sanchez   [place] Medication Instructions ARU:[id number]    Printed on:03/04/20 3844   Medication Information                      albuterol sulfate  (90 Base) MCG/ACT inhaler  Inhale 2 puffs into the lungs 4 times daily as needed for Wheezing             aspirin 81 MG tablet  Take 81 mg by mouth daily             escitalopram (LEXAPRO) 20 MG tablet  Take 20 mg by mouth daily             famotidine (PEPCID) 20 MG tablet  Take 1 tablet by mouth 2 times daily             gabapentin (NEURONTIN) 300 MG capsule  Take 300 mg by mouth 3 times daily. GLUCOSAMINE-CHONDROITIN PO  Take by mouth             hydrocortisone 1 % cream  Apply topically 2 times daily. isosorbide mononitrate (IMDUR) 60 MG extended release tablet  Take 60 mg by mouth daily             metoprolol tartrate (LOPRESSOR) 25 MG tablet  Take 1 tablet by mouth 2 times daily             nitroGLYCERIN (NITROSTAT) 0.4 MG SL tablet  up to max of 3 total doses.  If no relief after 1 dose, call 911.             pioglitazone (ACTOS) 45 MG tablet  Take 45 mg by mouth daily             polyethylene glycol (GLYCOLAX) packet  Take 17 g by mouth daily as needed for Constipation             Semaglutide (OZEMPIC, 0.25 OR 0.5 MG/DOSE, SC)  Inject into the skin Pharmacy to clarify             simvastatin (ZOCOR) 20 MG tablet  Take 20 mg by mouth nightly                     Physical Examination     Vitals:  Vitals:    03/04/20 0400 03/04/20 0815 03/04/20 1019 03/04/20 1108   BP: 125/71 133/83  123/69   Pulse: 66 70  64   Resp: 16 16  16   Temp: 98 °F (36.7 °C) 97.9 °F (36.6 °C)  98.2 °F (36.8 °C)   TempSrc: Oral Oral  Oral   SpO2: 93% 93% 95% 95%   Weight:       Height:           Weight: Weight: 207 lb 6.4 oz (94.1 kg)     24 hour intake/output:    Intake/Output Summary (Last 24 hours) at 3/4/2020 1526  Last data filed at 3/4/2020 0404  Gross per 24 hour   Intake 1800.98 ml   Output 0 ml   Net 1800.98 ml         General appearance:  No apparent distress. HEENT: Normocephalic. PERRL. Extraocular motion intact. Conjunctivae clear. Nose symmetric without evidence of discharge. Oral mucosa moist w/o erythema or exudate. Neck: Supple. No JVD. No carotid bruits. Trachea midline. No thyromegaly. ardiovascular:  RRR w/ normal S1/S2. No murmurs, rubs or gallops. Respiratory: Clear to auscultation, bilaterally without rales/wheezes/rhonchi. Abdomen: Soft, non-tender, non-distended with normal bowel sounds. Musculoskeletal:  Full ROM without deformity. Neurologic:  No focal sensory/motor deficits. Cranial nerves: II-XII intact. Lymphatic: No cervical lymphadenopathy. Psychiatric:  Alert and oriented. Vascular: Dorsalis pedis pulses bilaterally palpable 2+. Radial pulses palpable bilaterally 2+. No peripheral edema. Capillary refill<3 seconds. Area at right wrist used for cath insertion C/D/I  Genitourinary: Deferred. Skin: No visible rashes or lesions. Labs     Labs:  For convenience and continuity +--------+-----+----+-----------+-----+--------+---+--+--------+------+----+ ! Recovery! 00:00!    !           !135  !211/75  !   !  !        !      !    ! +--------+-----+----+-----------+-----+--------+---+--+--------+------+----+ ! Recovery! 02:00!    !           !102  ! 156/71  !   !  !        !      !    ! +--------+-----+----+-----------+-----+--------+---+--+--------+------+----+ ! Recovery! 05:00!    !           !91   !152/71  !   !  !        !      !    ! +--------+-----+----+-----------+-----+--------+---+--+--------+------+----+  Peak HR:166 bpm                 HR response: Appropriate  Peak BP:211/75 mmHg             BP response: Normal Resting BP with  Predicted HR: 160 bpm           hypertensive response to Stress  % of predicted HR: 104          HR/BP product:13149  Test duration:6 min             Max exercise: 7.1 METS  Reason for termination:Protocol  completed   ECG Findings  Ischemic ST-T wave changes. Complications  Procedure complication was none. Imaging Protocols   Rest                                Stress   Isotope:Tc-99m Sestamibi            Isotope: Tc-99m Sestamibi  Isotope dose:11 mCi                 Isotope dose:35 mCi  Date:03/02/2020 08:34               Date:03/02/2020 09:58   Technique:           SPECT          Technique:           Gated                                                           SPECT  Imaging Results:Calculated gated LVEF 62 %. mild inferior ischemia normal EF  Conclusions   Summary  This Nuclear Medicine study was abnormal .  inferior ischemia  abnormal ECG   Recommendation  Clinical correlation is recommended. Invasive ischemia workup is recommended if clinically indicated.    Signatures   ----------------------------------------------------------------  Electronically signed by Timothy Churchill MD (Interpreting  Cardiologist) on 03/02/2020 at 16:25  ----------------------------------------------------------------  Medical History   Accession#:

## 2020-03-04 NOTE — PROCEDURES
EKG was handed to [employer].
achieved  with a Vasc band device. SUMMARY:  1. Nonobstructive coronary artery disease. 2.  Normal LV systolic function. RECOMMENDATIONS:  1. Aggressive risk factor modification. 2.  Lipid lowering therapy. 3.  Follow up in clinic in one to two weeks with primary cardiologist or  myself to evaluate symptoms further.         Yokasta Pastrana MD    D: 03/03/2020 21:59:23       T: 03/03/2020 23:31:49     [email or web address]  Job#: [id number]     Doc#: [id number]    CC:

## 2020-03-10 ENCOUNTER — OFFICE VISIT (OUTPATIENT)
Dept: CARDIOLOGY CLINIC | Age: 60
End: 2020-03-10
Payer: COMMERCIAL

## 2020-03-10 VITALS
HEART RATE: 67 BPM | DIASTOLIC BLOOD PRESSURE: 67 MMHG | HEIGHT: 69 IN | BODY MASS INDEX: 31.55 KG/M2 | SYSTOLIC BLOOD PRESSURE: 113 MMHG | WEIGHT: 213 LBS

## 2020-03-10 PROCEDURE — 99213 OFFICE O/P EST LOW 20 MIN: CPT | Performed by: NURSE PRACTITIONER

## 2020-03-10 PROCEDURE — 3051F HG A1C>EQUAL 7.0%<8.0%: CPT | Performed by: NURSE PRACTITIONER

## 2020-03-10 NOTE — PROGRESS NOTES
[facility] PROFESSIONAL SERVICES  [facility] [address]   [address] [address]   Dept: [phone]   Dept Fax: [phone]   Loc: [phone]      Chief Complaint   Patient presents with    Follow-Up from Hospital     F/U from hospitalization in a 59 y/o male with history of HTN and dyslipidemia for atypical chest pain. Stress test was abnormal and subsequent cardiac cath with nonobstructive disease. Echo with preserved EF. Denies chest pain, palpitations, NIVIA, lightheadedness, dizziness or syncope since going home. Has some intermittent sob with exertion that he has had for years. He was started on an inhaler during hospital stay and advised to f/u with pulmonary for evaluation. Cardiologist:  Dr. Ellis Mini:   No fever, no chills, No fatigue or weight loss  Pulmonary:    No dyspnea, no wheezing  Cardiac:    Denies recent chest pain   GI:     No nausea or vomiting, no abdominal pain  Neuro:    No dizziness or light headedness  Musculoskeletal:  No recent active issues  Extremities:   No edema, good peripheral pulses      Past Medical History:   Diagnosis Date    Anxiety     Hyperlipidemia     Hypertension     Neuropathy        No Known Allergies    Current Outpatient Medications   Medication Sig Dispense Refill    famotidine (PEPCID) 20 MG tablet Take 1 tablet by mouth 2 times daily 60 tablet 3    hydrocortisone 1 % cream Apply topically 2 times daily. 1 Tube 1    metoprolol tartrate (LOPRESSOR) 25 MG tablet Take 1 tablet by mouth 2 times daily 60 tablet 3    nitroGLYCERIN (NITROSTAT) 0.4 MG SL tablet up to max of 3 total doses.  If no relief after 1 dose, call 911. 25 tablet 3    albuterol sulfate  (90 Base) MCG/ACT inhaler Inhale 2 puffs into the lungs 4 times daily as needed for Wheezing 1 Inhaler 0    simvastatin (ZOCOR) 20 MG tablet Take 20 mg by mouth nightly      escitalopram (LEXAPRO) 20 MG tablet Take 20 mg by mouth daily      Itz Bradshaw MD, Pulmonology, [facility]   2. Atypical chest pain     3. Essential hypertension     4. Non-insulin dependent type 2 diabetes mellitus ([facility] [address]         Orders Placed This Encounter   Procedures   Cliff Kim MD, Pulmonology, [facility]     Referral Priority:   Routine     Referral Type:   Eval and Treat     Referral Reason:   Specialty Services Required     Referred to Provider:   Hunter Perez MD     Requested Specialty:   Pulmonology     Number of Visits Requested:   1   Chest pain f/u after abnormal stress test and Nonobstructive CAD per cath. Denies chest pain or increased sob. PCP follows lipids and hepatic panel  Will refer to pulmonary for intermittent DIAS and evaluation.       Discussed use, benefit, and side effects of prescribed medications. All patient questions answered. Pt voiced understanding. Instructed to continue current medications, diet and exercise. Continue risk factor modification and medical management. Patient agreed with treatment plan. Follow up as directed.     Continue Dr Jigna Redmond current treatment plan  Follow up with Dr Conrad Newton as scheduled or sooner if needed

## 2020-03-29 NOTE — PROGRESS NOTES
the time of addendum. No acute pulmonary arterial embolus. No lobar consolidation. Final report electronically signed by Dr. Mojica Section on 3/4/2020 1:58 PM          Pulmonary function tests:  None in Epic    Echocardiogram:  Unknown Provider Result 3/2/2020       Narrative & Impression     Transthoracic Echocardiography Report (TTE)     Conclusions      Summary   Ejection fraction is visually estimated at 55%. Overall left ventricular function is normal.      Signature      ----------------------------------------------------------------   Electronically signed by Melanie Lafleur MD (Interpreting   physician) on 2020 at 05:25 PM   ----------------------------------------------------------------    Cardiac cath:                  CARDIAC CATHETERIZATION     PATIENT NAME: Ashley Morel                    :        1960  MED REC NO:   [id number]                           ROOM:       0032  ACCOUNT NO:   [de-identified]                           ADMIT DATE: 2020  PROVIDER:     iWld Walters MD     SUMMARY:  1. Nonobstructive coronary artery disease. 2.  Normal LV systolic function. RECOMMENDATIONS:  1. Aggressive risk factor modification. 2.  Lipid lowering therapy. 3.  Follow up in clinic in one to two weeks with primary cardiologist or  myself to evaluate symptoms further. 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 ? Developing ILD. He remain asymptomatic at this time. No limitation in functional status. Needs follow up. Radiologist recommended  a 3-6 month follow-up.  -Chronic hx of tobacco smoking for 20years with 3PPD.  He quit smoking several years back.  -Anxiety disorder.  -Hypertension on meds- under control.  -Nonobstructive

## 2020-04-21 ENCOUNTER — OFFICE VISIT (OUTPATIENT)
Dept: PULMONOLOGY | Age: 60
End: 2020-04-21
Payer: COMMERCIAL

## 2020-04-21 VITALS
BODY MASS INDEX: 32.61 KG/M2 | TEMPERATURE: 96.4 F | HEIGHT: 69 IN | HEART RATE: 70 BPM | SYSTOLIC BLOOD PRESSURE: 124 MMHG | OXYGEN SATURATION: 98 % | DIASTOLIC BLOOD PRESSURE: 72 MMHG | WEIGHT: 220.2 LBS

## 2020-04-21 PROCEDURE — 99204 OFFICE O/P NEW MOD 45 MIN: CPT | Performed by: INTERNAL MEDICINE

## 2020-06-21 NOTE — PROGRESS NOTES
[place] [facility]  Pulmonary medicine clinic follow up note. Patient: Harjinder Roldan  :       Chief complaint/[place]:     Lung Nodule Screening     []? Qualifies    []? Does not qualify   []? Declined    [x]? Completed ct chest 2020    Harjinder Roldan is a 60 y. o.oldmale came for follow up regarding his abnormal chest CT chest after having a HRCT of chest.    He was initially referred from Dr. Robison Hams     He was recently admitted and discharged from [facility] in  for evaluation of atypical chest pain and abnormal stress test. He underwent cardiac cath and found to be normal. As a part of evaluation of his chest pain, he was evaluated with CTA of chest. It was found to be abnormal. He came for further evaluation. He underwent chest CTA scan on: 3/4/20  The above chest  CT was performed at: [place], [place]. He denies any cough, hemoptysis or expectoration. 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 He denies to orthopnea or paroxysmal nocturnal dyspnea. He is currently not using any oxygen supplementation at rest, exercise or during sleep/at night time. Conchetta Peaks He denies to any hx of connective tissue diseases including Systemic lupus Erythematosus, Rheumatoid arthritis or Sarcoidosis etc.    He admits to any lung cancer in first-degree relatives I.e his both parents  of lung cancer. He denies any hx of exposure to radon, or uranium. He had last Colonoscopy performed on: negative for malignancy. He had last colonoscopy 5years back.  He was found to have a benign polyp in the past.    He was never diagnosed with pulmonary diseases I.e bronchial asthma, COPD, pulmonary embolism or pleural effusion/s in the past.    He had last prostate check performed on: Negative for malignancy- 1year back. His PSA is normal last year. Social History:  Occupation:  He is current working: Yes  Type of profession: He is currently working for [employer] in [place]. History of tobacco smoking:Yes  Amount of tobacco smoking: 3PPD. Years of tobacco smokin.                                    Quit smoking: Yes. Quit [id number]     History of recreational or IV drug use in the past: He used to smoke Marijuana 35 to 40years back. He is not smoking Marijuana for the last 35 to 40years. 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 History of exposure to quarry/silica/sandblasting: NO  History of exposure to asbestos/working with breaks/ships: NO  History of exposure to farm dust: Yes. He is currently lives on a farm  History of recent travel to long distances: NO  History of exposure to birds, pigeons, or chickens in the past:NO  Pet animals at home:Yes  Dogs: 1  Cats: 0    History of pulmonary embolism in the past: No            History of DVT in the past:No                              Review of Systems:   General/Constitutional: he lost 2lbs of weight from the last visit with normal appetite. No fever or chills. HENT: Negative. Eyes: Negative. Upper respiratory tract: Occasional nasal stuffiness with no post nasal drip. Lower respiratory tract/ lungs: No cough or sputum production. No hemoptysis. Cardiovascular: No palpitations or chest pain. Gastrointestinal: No nausea or vomiting. Neurological: No focal neurologiacal weakness. Extremities: No edema. Musculoskeletal: No complaints. Genitourinary: No complaints. Hematological: Negative. Psychiatric/Behavioral: Negative. Skin: No itching.       Current Medications:          Past Medical reviewed. Constitutional: Patient appears moderately built and moderately nourished. No distress. Patient is oriented to person, place, and time. HENT:   Head: Normocephalic and atraumatic. Right Ear: External ear normal.   Left Ear: External ear normal.   Mouth/Throat: Oropharynx is clear and moist.  No oral thrush. Eyes: Conjunctivae are normal. Pupils are equal, round, and reactive to light. No scleral icterus. Neck: Neck supple. No JVD present. No tracheal deviation present. Cardiovascular: Normal rate, regular rhythm, normal heart sounds. No murmur heard. Pulmonary/Chest: Effort normal and breath sounds normal. No stridor. No respiratory distress. No wheezes. No rales. Patient exhibits no tenderness. Abdominal: Soft. Patient exhibits no distension. No tenderness. Musculoskeletal: Normal range of motion. Extremities: Patient exhibits no edema and no tenderness. Lymphadenopathy:  No cervical adenopathy. Neurological: Patient is alert and oriented to person, place, and time. Skin: Skin is warm and dry. Patient is not diaphoretic. Psychiatric: Patient  has a normal mood and affect. Patient behavior is normal.       Neck Circumference -   15.5  Mallampati - 2    Diagnostic Data:    Radiological Data:  PROCEDURE: CT CHEST W CONTRAST 3/4/2020  The body and impression of the report finding should state there is no central pulmonary arterial embolism. Examination was not tailored for pulmonary arterial embolism. There are few tiny groundglass opacities in the posterior right lung base which are nonspecific and could represent minimal developing infectious or inflammatory etiology. Correlation with symptoms and follow-up as clinically warranted. 3-6 month   follow-up can be performed if clinically warranted to document resolution. Findings and addendum were discussed with the Dr Aviva Cisse at the time of addendum. No acute pulmonary arterial embolus. No lobar consolidation.       Final report electronically signed by Dr. Urvashi Hernandez on 3/4/2020 1:58 PM          Pulmonary function tests:  None in Epic    Echocardiogram:  Unknown Provider Result 3/2/2020       Narrative & Impression     Transthoracic Echocardiography Report (TTE)     Conclusions      Summary   Ejection fraction is visually estimated at 55%. Overall left ventricular function is normal.      Signature      ----------------------------------------------------------------   Electronically signed by Salazar Mao MD (Interpreting   physician) on 2020 at 05:25 PM   ----------------------------------------------------------------    Cardiac cath:                  CARDIAC CATHETERIZATION     PATIENT NAME: Gigi Abreu                    :        1960  MED REC NO:   [id number]                           ROOM:       0032  ACCOUNT NO:   [de-identified]                           ADMIT DATE: 2020  PROVIDER:     Randi Echeverria MD     SUMMARY:  1. Nonobstructive coronary artery disease. 2.  Normal LV systolic function. RECOMMENDATIONS:  1. Aggressive risk factor modification. 2.  Lipid lowering therapy. 3.  Follow up in clinic in one to two weeks with primary cardiologist or  myself to evaluate symptoms further. Musa Urena MD    HRCT of chest:  2020   PROCEDURE: CT CHEST HIGH RESOLUTION   1. No acute findings. 2. There is minimal centrilobular emphysematous change noted. No bronchiectasis, honeycombing or significant groundglass opacities are noted. The above CT was compared with his old CT chest performed on 2020 ( Left)    Full PFTs: 2020            Assessment:  -Decreased diffusion capacity- No evidence of ILD noted on his latest HRCT of chest. He remain asymptomatic at this time. No limitation in functional status.  -Chronic hx of tobacco smoking for 20years with 3PPD. He quit smoking 23years back. No obstruction noted on his latest PFTS.   -Anxiety disorder.  -Hypertension on meds- under control.  -Nonobstructive coronary artery disease. Recommendations/Plan:  - Schedule patient for full pulmonary function tests at least 1week before clinic visit in 1year  - Patient to have chest X-ray PA and Lateral views in 1 year to follow his lung fileds. Chest Xray need to be done 2days before clinic visit. -Megan Mcdonnell was advised to make early appointment with my clinic if he develops any constitutional symptoms including loss of weight, poor appetite or hemoptysis. He verbalizes under standing.  - Schedule patient for follow up with my clinic in 1year with recommended tests I.e CXR and PFTS. Patient advised to make early appointment if needed. - Patient and his wife were educated about my impression and plan. They verbalizes understanding.

## 2020-07-13 ENCOUNTER — HOSPITAL ENCOUNTER (OUTPATIENT)
Age: 60
Discharge: HOME | End: 2020-07-13
Payer: COMMERCIAL

## 2020-07-13 PROCEDURE — U0002 COVID-19 LAB TEST NON-CDC: HCPCS

## 2020-07-14 LAB
PERFORMING LAB: NORMAL
REPORT: NORMAL
SARS-COV-2: NOT DETECTED

## 2020-07-16 ENCOUNTER — HOSPITAL ENCOUNTER (OUTPATIENT)
Dept: CT IMAGING | Age: 60
Discharge: HOME | End: 2020-07-16
Payer: COMMERCIAL

## 2020-07-16 ENCOUNTER — HOSPITAL ENCOUNTER (OUTPATIENT)
Dept: PULMONOLOGY | Age: 60
Discharge: HOME | End: 2020-07-16
Payer: COMMERCIAL

## 2020-07-16 PROCEDURE — 94726 PLETHYSMOGRAPHY LUNG VOLUMES: CPT

## 2020-07-16 PROCEDURE — 94010 BREATHING CAPACITY TEST: CPT

## 2020-07-16 PROCEDURE — 94729 DIFFUSING CAPACITY: CPT

## 2020-07-16 PROCEDURE — 71250 CT THORAX DX C-: CPT

## 2020-07-21 ENCOUNTER — OFFICE VISIT (OUTPATIENT)
Dept: PULMONOLOGY | Age: 60
End: 2020-07-21
Payer: COMMERCIAL

## 2020-07-21 VITALS
HEIGHT: 70 IN | SYSTOLIC BLOOD PRESSURE: 118 MMHG | BODY MASS INDEX: 31.21 KG/M2 | DIASTOLIC BLOOD PRESSURE: 66 MMHG | HEART RATE: 72 BPM | OXYGEN SATURATION: 98 % | TEMPERATURE: 97.6 F | WEIGHT: 218 LBS

## 2020-07-21 PROCEDURE — 99214 OFFICE O/P EST MOD 30 MIN: CPT | Performed by: INTERNAL MEDICINE

## 2020-07-21 NOTE — PATIENT INSTRUCTIONS
Recommendations/Plan:  - Schedule patient for full pulmonary function tests at least 1week before clinic visit in 1year  - Patient to have chest X-ray PA and Lateral views in 1 year to follow his lung fileds. Chest Xray need to be done 2days before clinic visit. -Sena Venegas was advised to make early appointment with my clinic if he develops any constitutional symptoms including loss of weight, poor appetite or hemoptysis. He verbalizes under standing.  - Schedule patient for follow up with my clinic in 1year with recommended tests I.e CXR and PFTS. Patient advised to make early appointment if needed. - Patient and his wife were educated about my impression and plan. They verbalizes understanding.

## 2021-03-11 ENCOUNTER — OFFICE VISIT (OUTPATIENT)
Dept: CARDIOLOGY CLINIC | Age: 61
End: 2021-03-11
Payer: COMMERCIAL

## 2021-03-11 VITALS
BODY MASS INDEX: 31.55 KG/M2 | HEIGHT: 69 IN | HEART RATE: 73 BPM | DIASTOLIC BLOOD PRESSURE: 78 MMHG | WEIGHT: 213 LBS | SYSTOLIC BLOOD PRESSURE: 124 MMHG

## 2021-03-11 DIAGNOSIS — I10 ESSENTIAL HYPERTENSION: ICD-10-CM

## 2021-03-11 DIAGNOSIS — R06.02 SOB (SHORTNESS OF BREATH): Primary | ICD-10-CM

## 2021-03-11 DIAGNOSIS — E78.01 FAMILIAL HYPERCHOLESTEROLEMIA: ICD-10-CM

## 2021-03-11 PROCEDURE — 99213 OFFICE O/P EST LOW 20 MIN: CPT | Performed by: NUCLEAR MEDICINE

## 2021-03-11 PROCEDURE — 93000 ELECTROCARDIOGRAM COMPLETE: CPT | Performed by: NUCLEAR MEDICINE

## 2021-03-11 RX ORDER — ISOSORBIDE MONONITRATE 60 MG/1
60 TABLET, EXTENDED RELEASE ORAL DAILY
Qty: 90 TABLET | Refills: 3 | Status: SHIPPED | OUTPATIENT
Start: 2021-03-11 | End: 2022-03-08

## 2021-03-11 RX ORDER — HYDROCODONE BITARTRATE AND ACETAMINOPHEN 5; 325 MG/1; MG/1
1 TABLET ORAL EVERY 6 HOURS PRN
Status: ON HOLD | COMMUNITY
End: 2022-09-12 | Stop reason: HOSPADM

## 2021-03-11 NOTE — PROGRESS NOTES
[id number] [place] [employer] .  [address] [place] [id number]  Dept: [phone]  Dept Fax: [phone]  Loc: [phone]    Visit Date: 3/11/2021    Gisela Wasserman is a 63 y.o. male who presents todayfor:  Chief Complaint   Patient presents with    Check-Up    Coronary Artery Disease    Hypertension    Hyperlipidemia   some atypical chest pain  Cath last year   Mild CAD  Did have COVID last year too   Did well  Did ave chest pain with it  Know risk for CAD  No new symptoms  BP is stable         HPI:  HPI  Past Medical History:   Diagnosis Date    Anxiety     Hyperlipidemia     Hypertension     Neuropathy       Past Surgical History:   Procedure Laterality Date    BACK SURGERY      CHOLECYSTECTOMY      COLONOSCOPY      CORONARY ANGIOPLASTY      no stents; dr Joon Granados History   Problem Relation Age of Onset    Lung Cancer Mother     Lung Cancer Father     Hypertension Maternal Grandfather      Social History     Tobacco Use    Smoking status: Former Smoker     Packs/day: 3.00     Types: Cigarettes     Quit date:      Years since quittin.2    Smokeless tobacco: Never Used   Substance Use Topics    Alcohol use: Yes     Comment: SOCIAL      Current Outpatient Medications   Medication Sig Dispense Refill    HYDROcodone-acetaminophen (NORCO) 5-325 MG per tablet Take 1 tablet by mouth every 6 hours as needed for Pain.  famotidine (PEPCID) 20 MG tablet Take 1 tablet by mouth 2 times daily 60 tablet 3    metoprolol tartrate (LOPRESSOR) 25 MG tablet Take 1 tablet by mouth 2 times daily 60 tablet 3    nitroGLYCERIN (NITROSTAT) 0.4 MG SL tablet up to max of 3 total doses.  If no relief after 1 dose, call 911. 25 tablet 3    albuterol sulfate  (90 Base) MCG/ACT inhaler Inhale 2 puffs into the lungs 4 times daily as needed for Wheezing 1 Inhaler 0    simvastatin (ZOCOR) 20 MG tablet Take 20 mg by mouth nightly      escitalopram (LEXAPRO) 20 MG tablet Take 20 mg by mouth daily      gabapentin (NEURONTIN) 300 MG capsule Take 300 mg by mouth 3 times daily.  isosorbide mononitrate (IMDUR) 60 MG extended release tablet Take 60 mg by mouth daily      aspirin 81 MG tablet Take 81 mg by mouth daily      pioglitazone (ACTOS) 45 MG tablet Take 45 mg by mouth daily      GLUCOSAMINE-CHONDROITIN PO Take by mouth      Semaglutide (OZEMPIC, 0.25 OR 0.5 MG/DOSE, SC) Inject into the skin Pharmacy to clarify       No current facility-administered medications for this visit. Allergies   Allergen Reactions    Seasonal      Health Maintenance   Topic Date Due    Hepatitis C screen  Never done    Pneumococcal 0-64 years Vaccine (1 of 1 - PPSV23) Never done    Diabetic foot exam  Never done    Diabetic retinal exam  Never done    HIV screen  Never done    COVID-19 Vaccine (1 of 2) Never done    Diabetic microalbuminuria test  Never done    DTaP/Tdap/Td vaccine (1 - Tdap) Never done    Shingles Vaccine (1 of 2) Never done    Colon cancer screen colonoscopy  Never done    Flu vaccine (1) Never done    A1C test (Diabetic or Prediabetic)  03/01/2021    Lipid screen  03/03/2021    Hepatitis A vaccine  Aged Out    Hib vaccine  Aged Out    Meningococcal (ACWY) vaccine  Aged Out       Subjective:  Review of Systems  General:   No fever, no chills, No fatigue or weight loss  Pulmonary:    No dyspnea, no wheezing  Cardiac:    Denies recent chest pain,   GI:     No nausea or vomiting, no abdominal pain  Neuro:    No dizziness or light headedness,   Musculoskeletal:  No recent active issues  Extremities:   No edema, no obvious claudication       Objective:  Physical Exam  /78   Pulse 73   Ht 5' 9\" (1.753 m)   Wt 213 lb (96.6 kg)   BMI 31.45 kg/m²   General:   Well developed, well nourished  Lungs:   Clear to auscultation  Heart:    Normal S1 S2, Slight murmur.  no rubs, no gallops  Abdomen:   Soft, non tender, no organomegalies, positive bowel sounds  Extremities:   No edema, no cyanosis, good peripheral pulses  Neurological:   Awake, alert, oriented. No obvious focal deficits  Musculoskelatal:  No obvious deformities    Assessment:      Diagnosis Orders   1. SOB (shortness of breath)  EKG 12 lead   2. Essential hypertension  EKG 12 lead   3. Familial hypercholesterolemia     as above  cardiac fair for now  ECG in office was done today. I reviewed the ECG. No acute findings      Plan:  No follow-ups on file. As above  Continue risk factor modification and medical management    . Thank you for allowing me to participate in the care of your patient. Please don't hesitate to contact me regarding any further issues related to the patient care    Orders Placed:  Orders Placed This Encounter   Procedures    EKG 12 lead     Order Specific Question:   Reason for Exam?     Answer: Other       Medications Prescribed:  No orders of the defined types were placed in this encounter. Discussed use, benefit, and side effects of prescribed medications. All patient questions answered. Pt voicedunderstanding. Instructed to continue current medications, diet and exercise. Continue risk factor modification and medical management. Patient agreed with treatment plan. Follow up as directed.     Electronically signedby Jennifer Forbes MD on 3/11/2021 at 9:15 AM

## 2021-06-20 NOTE — PROGRESS NOTES
[facility]  Pulmonary medicine clinic follow up note. Patient: Nelly Richardson  :       Chief complaint/[place]:     Lung Nodule Screening     []? Qualifies    []? Does not qualify   []? Declined    [x]? Completed ct chest 2020    Nelly Richardson is a 63 y. o.oldmale came for follow up regarding his abnormal chest CT chest after having a chest x-ray PA and lateral views and full pulmonary function test before clinic visit. On today's questioning:  He denies cough or expectoration. He denies hemoptysis. He denies fever or chills. He denies recent hospitalizations or emergency room visits. He is currently not using any inhalers. He denies recent loss of weight or appetite changes. He denies recent decline in functional status. He was initially referred from Dr. Lana Sharma MD     He underwent chest CTA scan on: 3/4/20  The above chest  CT was performed at: [place], [place]. He denies any cough, hemoptysis or expectoration. 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 He denies to orthopnea or paroxysmal nocturnal dyspnea. He is currently not using any oxygen supplementation at rest, exercise or during sleep/at night time. Bryan Medina He denies to any hx of connective tissue diseases including Systemic lupus Erythematosus, Rheumatoid arthritis or Sarcoidosis etc.    He admits to any lung cancer in first-degree relatives I.e his both parents  of lung cancer. He denies any hx of exposure to radon, or uranium. He had last Colonoscopy performed on: negative for malignancy. He had last colonoscopy 5years back.  He was found to have a benign polyp in the past.    He was never diagnosed with pulmonary diseases I.e bronchial asthma, COPD, pulmonary embolism or pleural effusion/s in the past.    He had last prostate check performed on: Negative for malignancy- 1year back. His PSA is normal last year. Social History:  Occupation:  He is current working: Yes  Type of profession: He is currently working for [employer] in [facility]. History of tobacco smoking:Yes  Amount of tobacco smoking: 3PPD. Years of tobacco smokin.                                    Quit smoking: Yes. Quit TUOS:[id number]     History of recreational or IV drug use in the past: He used to smoke Marijuana 35 to 40years back. He is not smoking Marijuana for the last 35 to 40years. 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 History of exposure to quarry/silica/sandblasting: NO  History of exposure to asbestos/working with breaks/ships: NO  History of exposure to farm dust: Yes. He is currently lives on a farm  History of recent travel to long distances: NO  History of exposure to birds, pigeons, or chickens in the past:NO  Pet animals at home:Yes  Dogs: 1  Cats: 0    History of pulmonary embolism in the past: No            History of DVT in the past:No                              Review of Systems:   General/Constitutional: he gained 2lbs of weight from the last visit. No fever or chills. HENT: Negative. Eyes: Negative. Upper respiratory tract: No nasal stuffiness or post nasal drip. Lower respiratory tract/ lungs: No cough or sputum production. Cardiovascular: No palpitations or chest pain. Gastrointestinal: No nausea or vomiting. Neurological: No focal neurologiacal weakness. Extremities: No edema. Musculoskeletal: No complaints. Genitourinary: No complaints. Hematological: Negative. Psychiatric/Behavioral: Negative. Skin: No itching.       Current Medications:      Past Medical History:   Diagnosis Date    Anxiety     Hyperlipidemia     Hypertension     Neuropathy        Past Surgical History:   Procedure Laterality Date    BACK SURGERY      CHOLECYSTECTOMY      COLONOSCOPY      CORONARY ANGIOPLASTY  2011    no stents; dr Holley Laser         Allergies   Allergen Reactions    Seasonal        Current Outpatient Medications   Medication Sig Dispense Refill    HYDROcodone-acetaminophen (NORCO) 5-325 MG per tablet Take 1 tablet by mouth every 6 hours as needed for Pain.  isosorbide mononitrate (IMDUR) 60 MG extended release tablet Take 1 tablet by mouth daily 90 tablet 3    famotidine (PEPCID) 20 MG tablet Take 1 tablet by mouth 2 times daily 60 tablet 3    metoprolol tartrate (LOPRESSOR) 25 MG tablet Take 1 tablet by mouth 2 times daily 60 tablet 3    nitroGLYCERIN (NITROSTAT) 0.4 MG SL tablet up to max of 3 total doses. If no relief after 1 dose, call 911. 25 tablet 3    albuterol sulfate  (90 Base) MCG/ACT inhaler Inhale 2 puffs into the lungs 4 times daily as needed for Wheezing 1 Inhaler 0    simvastatin (ZOCOR) 20 MG tablet Take 20 mg by mouth nightly      escitalopram (LEXAPRO) 20 MG tablet Take 20 mg by mouth daily      gabapentin (NEURONTIN) 300 MG capsule Take 300 mg by mouth 3 times daily.  aspirin 81 MG tablet Take 81 mg by mouth daily      pioglitazone (ACTOS) 45 MG tablet Take 45 mg by mouth daily      GLUCOSAMINE-CHONDROITIN PO Take by mouth      Semaglutide (OZEMPIC, 0.25 OR 0.5 MG/DOSE, SC) Inject into the skin Pharmacy to clarify       No current facility-administered medications for this visit.        Family History   Problem Relation Age of Onset    Lung Cancer Mother     Lung Cancer Father     Hypertension Maternal Grandfather            Physical Exam     VITALS:  /82 (Site: Left Upper Arm, Position: Sitting, Cuff Size: Medium Adult)   Pulse 73   Temp 97.5 °F (36.4 °C)   Ht 5' 9\" (1.753 m)   Wt 220 lb (99.8 kg)   SpO2 98% Comment: room air at rest  BMI 32.49 kg/m²   Nursing note and vitals reviewed. Constitutional: Patient appears moderately built and moderately nourished. No distress. Patient is oriented to person, place, and time. HENT:   Head: Normocephalic and atraumatic. Right Ear: External ear normal.   Left Ear: External ear normal.   Mouth/Throat: Oropharynx is clear and moist.  No oral thrush. Eyes: Conjunctivae are normal. Pupils are equal, round, and reactive to light. No scleral icterus. Neck: Neck supple. No JVD present. No tracheal deviation present. Cardiovascular: Normal rate, regular rhythm, normal heart sounds. No murmur heard. Pulmonary/Chest: Effort normal and breath sounds normal. No stridor. No respiratory distress. No wheezes. No rales. Patient exhibits no tenderness. Abdominal: Soft. Patient exhibits no distension. No tenderness. Musculoskeletal: Normal range of motion. Extremities: Patient exhibits no edema and no tenderness. Lymphadenopathy:  No cervical adenopathy. Neurological: Patient is alert and oriented to person, place, and time. Skin: Skin is warm and dry. Patient is not diaphoretic. Psychiatric: Patient  has a normal mood and affect. Patient behavior is normal. .       Neck Circumference -   15.5  Mallampati - 2    Diagnostic Data:    Radiological Data:  PROCEDURE: CT CHEST W CONTRAST 3/4/2020  The body and impression of the report finding should state there is no central pulmonary arterial embolism. Examination was not tailored for pulmonary arterial embolism. There are few tiny groundglass opacities in the posterior right lung base which are nonspecific and could represent minimal developing infectious or inflammatory etiology. Correlation with symptoms and follow-up as clinically warranted. 3-6 month   follow-up can be performed if clinically warranted to document resolution.       Findings and addendum were discussed with the Dr Minerva Kamara at the time of addendum. No acute pulmonary arterial embolus. No lobar consolidation. Final report electronically signed by Dr. Lino Saavedra on 3/4/2020 1:58 PM          Echocardiogram:  Unknown Provider Result 3/2/2020       Narrative & Impression     Transthoracic Echocardiography Report (TTE)     Conclusions      Summary   Ejection fraction is visually estimated at 55%. Overall left ventricular function is normal.      Signature      ----------------------------------------------------------------   Electronically signed by Gricelda Zacarias MD (Interpreting   physician) on 2020 at 05:25 PM   ----------------------------------------------------------------    Cardiac cath:                  CARDIAC CATHETERIZATION     PATIENT NAME: Giovanni Dailey                    :        1960  MED REC NO:   [id number]                           ROOM:       0032  ACCOUNT NO:   [de-identified]                           ADMIT DATE: 2020  PROVIDER:     Olivia Estrada MD     SUMMARY:  1. Nonobstructive coronary artery disease. 2.  Normal LV systolic function. RECOMMENDATIONS:  1. Aggressive risk factor modification. 2.  Lipid lowering therapy. 3.  Follow up in clinic in one to two weeks with primary cardiologist or  myself to evaluate symptoms further. Saul Friend MD    HRCT of chest:  2020   PROCEDURE: CT CHEST HIGH RESOLUTION   1. No acute findings. 2. There is minimal centrilobular emphysematous change noted. No bronchiectasis, honeycombing or significant groundglass opacities are noted. The above CT was compared with his old CT chest performed on 2020 ( Left)    Full PFTs: 2020        Chest X-ray PA and lateral views:    9:51 AM   PROCEDURE: XR CHEST (2 VW)   There is no acute intrathoracic process.        Pulmonary function tests: 2021              Assessment:  -Decreased diffusion capacity- No evidence of ILD noted on his latest HRCT of chest. He remain asymptomatic at this time. No limitation in functional status.  -Chronic hx of tobacco smoking for 20years with 3PPD. He quit smoking 23years back. No obstruction noted on his latest PFTS. His latest chest x-ray is no acute process. -Anxiety disorder.  -Hypertension on meds- under control.  -Nonobstructive coronary artery disease. Recommendations/Plan:  -Schedule patient for follow up with my clinic on PRN/As needed basis for pulmonary/lung related issues including dyspea. Patient to follow with his family physician closely.   -Falguni Zepeda was advised to make early appointment with my clinic if he develops any constitutional symptoms including loss of weight, poor appetite or hemoptysis. He verbalizes under standing.  - Patient educated about my impression and plan. He verbalizes understanding.    -I personally reviewed and updated the Past medical hx, Past surgical hx,Social hx, Family hx, Medications, Allergies in the discrete data section of the patient chart. I also reviewed pertaining labs and Pulmonary medicine,Sleep medicine related, Pathological, Microbiological and Radiological investigations.

## 2021-07-16 ENCOUNTER — HOSPITAL ENCOUNTER (OUTPATIENT)
Dept: PULMONOLOGY | Age: 61
Discharge: HOME | End: 2021-07-16
Payer: COMMERCIAL

## 2021-07-16 DIAGNOSIS — R94.2 DECREASED DIFFUSION CAPACITY: ICD-10-CM

## 2021-07-16 DIAGNOSIS — Z72.0 TOBACCO ABUSE: ICD-10-CM

## 2021-07-16 DIAGNOSIS — J44.9 CHRONIC OBSTRUCTIVE PULMONARY DISEASE, UNSPECIFIED COPD TYPE (HCC): ICD-10-CM

## 2021-07-16 PROCEDURE — 94726 PLETHYSMOGRAPHY LUNG VOLUMES: CPT

## 2021-07-16 PROCEDURE — 94729 DIFFUSING CAPACITY: CPT

## 2021-07-16 PROCEDURE — 94010 BREATHING CAPACITY TEST: CPT

## 2021-07-20 ENCOUNTER — HOSPITAL ENCOUNTER (OUTPATIENT)
Age: 61
Discharge: HOME | End: 2021-07-20
Payer: COMMERCIAL

## 2021-07-20 ENCOUNTER — HOSPITAL ENCOUNTER (OUTPATIENT)
Dept: GENERAL RADIOLOGY | Age: 61
Discharge: HOME | End: 2021-07-20
Payer: COMMERCIAL

## 2021-07-20 ENCOUNTER — OFFICE VISIT (OUTPATIENT)
Dept: PULMONOLOGY | Age: 61
End: 2021-07-20
Payer: COMMERCIAL

## 2021-07-20 VITALS
OXYGEN SATURATION: 98 % | BODY MASS INDEX: 32.58 KG/M2 | WEIGHT: 220 LBS | HEIGHT: 69 IN | SYSTOLIC BLOOD PRESSURE: 138 MMHG | TEMPERATURE: 97.5 F | HEART RATE: 73 BPM | DIASTOLIC BLOOD PRESSURE: 82 MMHG

## 2021-07-20 DIAGNOSIS — R94.2 DECREASED DIFFUSION CAPACITY: ICD-10-CM

## 2021-07-20 DIAGNOSIS — J44.9 CHRONIC OBSTRUCTIVE PULMONARY DISEASE, UNSPECIFIED COPD TYPE (HCC): ICD-10-CM

## 2021-07-20 DIAGNOSIS — Z72.0 TOBACCO ABUSE: ICD-10-CM

## 2021-07-20 DIAGNOSIS — R06.09 DYSPNEA ON EXERTION: Primary | ICD-10-CM

## 2021-07-20 PROCEDURE — 99214 OFFICE O/P EST MOD 30 MIN: CPT | Performed by: INTERNAL MEDICINE

## 2021-07-20 PROCEDURE — 71046 X-RAY EXAM CHEST 2 VIEWS: CPT

## 2021-07-20 NOTE — PROGRESS NOTES
Neck Circumference -   15.5  Mallampati - 2    Lung Nodule Screening     [] Qualifies    [] Does not qualify   [] Declined    [] Completed

## 2022-03-08 RX ORDER — ISOSORBIDE MONONITRATE 60 MG/1
TABLET, EXTENDED RELEASE ORAL
Qty: 90 TABLET | Refills: 0 | Status: SHIPPED | OUTPATIENT
Start: 2022-03-08

## 2022-06-06 RX ORDER — ISOSORBIDE MONONITRATE 60 MG/1
TABLET, EXTENDED RELEASE ORAL
Qty: 90 TABLET | Refills: 0 | OUTPATIENT
Start: 2022-06-06

## 2022-09-12 ENCOUNTER — ANESTHESIA EVENT (OUTPATIENT)
Dept: OPERATING ROOM | Age: 62
End: 2022-09-12
Payer: COMMERCIAL

## 2022-09-12 ENCOUNTER — APPOINTMENT (OUTPATIENT)
Dept: GENERAL RADIOLOGY | Age: 62
End: 2022-09-12
Attending: ORTHOPAEDIC SURGERY
Payer: COMMERCIAL

## 2022-09-12 ENCOUNTER — ANESTHESIA (OUTPATIENT)
Dept: OPERATING ROOM | Age: 62
End: 2022-09-12
Payer: COMMERCIAL

## 2022-09-12 ENCOUNTER — HOSPITAL ENCOUNTER (OUTPATIENT)
Age: 62
Setting detail: OUTPATIENT SURGERY
Discharge: HOME | End: 2022-09-12
Attending: ORTHOPAEDIC SURGERY | Admitting: ORTHOPAEDIC SURGERY
Payer: COMMERCIAL

## 2022-09-12 VITALS
OXYGEN SATURATION: 96 % | DIASTOLIC BLOOD PRESSURE: 70 MMHG | SYSTOLIC BLOOD PRESSURE: 123 MMHG | HEART RATE: 64 BPM | HEIGHT: 69 IN | RESPIRATION RATE: 18 BRPM | WEIGHT: 209.2 LBS | TEMPERATURE: 96.5 F | BODY MASS INDEX: 30.98 KG/M2

## 2022-09-12 DIAGNOSIS — Z98.890 S/P ORIF (OPEN REDUCTION INTERNAL FIXATION) FRACTURE: Primary | ICD-10-CM

## 2022-09-12 DIAGNOSIS — Z87.81 S/P ORIF (OPEN REDUCTION INTERNAL FIXATION) FRACTURE: Primary | ICD-10-CM

## 2022-09-12 LAB
GLUCOSE BLD-MCNC: 154 MG/DL (ref 70–108)
GLUCOSE BLD-MCNC: 168 MG/DL (ref 70–108)

## 2022-09-12 PROCEDURE — 6370000000 HC RX 637 (ALT 250 FOR IP): Performed by: ANESTHESIOLOGY

## 2022-09-12 PROCEDURE — 6360000002 HC RX W HCPCS: Performed by: ANESTHESIOLOGY

## 2022-09-12 PROCEDURE — 7100000011 HC PHASE II RECOVERY - ADDTL 15 MIN: Performed by: ORTHOPAEDIC SURGERY

## 2022-09-12 PROCEDURE — 82948 REAGENT STRIP/BLOOD GLUCOSE: CPT

## 2022-09-12 PROCEDURE — 3700000000 HC ANESTHESIA ATTENDED CARE: Performed by: ORTHOPAEDIC SURGERY

## 2022-09-12 PROCEDURE — 2500000003 HC RX 250 WO HCPCS: Performed by: ORTHOPAEDIC SURGERY

## 2022-09-12 PROCEDURE — 2720000010 HC SURG SUPPLY STERILE: Performed by: ORTHOPAEDIC SURGERY

## 2022-09-12 PROCEDURE — 3600000004 HC SURGERY LEVEL 4 BASE: Performed by: ORTHOPAEDIC SURGERY

## 2022-09-12 PROCEDURE — 3700000001 HC ADD 15 MINUTES (ANESTHESIA): Performed by: ORTHOPAEDIC SURGERY

## 2022-09-12 PROCEDURE — 2500000003 HC RX 250 WO HCPCS: Performed by: ANESTHESIOLOGY

## 2022-09-12 PROCEDURE — 73100 X-RAY EXAM OF WRIST: CPT

## 2022-09-12 PROCEDURE — 2580000003 HC RX 258: Performed by: ORTHOPAEDIC SURGERY

## 2022-09-12 PROCEDURE — 2709999900 HC NON-CHARGEABLE SUPPLY: Performed by: ORTHOPAEDIC SURGERY

## 2022-09-12 PROCEDURE — 7100000001 HC PACU RECOVERY - ADDTL 15 MIN: Performed by: ORTHOPAEDIC SURGERY

## 2022-09-12 PROCEDURE — 7100000010 HC PHASE II RECOVERY - FIRST 15 MIN: Performed by: ORTHOPAEDIC SURGERY

## 2022-09-12 PROCEDURE — 7100000000 HC PACU RECOVERY - FIRST 15 MIN: Performed by: ORTHOPAEDIC SURGERY

## 2022-09-12 PROCEDURE — C1713 ANCHOR/SCREW BN/BN,TIS/BN: HCPCS | Performed by: ORTHOPAEDIC SURGERY

## 2022-09-12 PROCEDURE — 3209999900 FLUORO FOR SURGICAL PROCEDURES

## 2022-09-12 PROCEDURE — 3600000014 HC SURGERY LEVEL 4 ADDTL 15MIN: Performed by: ORTHOPAEDIC SURGERY

## 2022-09-12 PROCEDURE — 6360000002 HC RX W HCPCS: Performed by: ORTHOPAEDIC SURGERY

## 2022-09-12 DEVICE — EVOS 2.4MM X 12MM LOCKING SCREW T7 SELF-TAPPING
Type: IMPLANTABLE DEVICE | Status: FUNCTIONAL
Brand: EVOS

## 2022-09-12 DEVICE — EVOS 2.4MM X 16MM LOCKING SCREW T7 SELF-TAPPING
Type: IMPLANTABLE DEVICE | Status: FUNCTIONAL
Brand: EVOS

## 2022-09-12 DEVICE — EVOS DISTAL RADIUS VOLAR PLATE 3                                    HOLE LEFT STANDARD 48MM
Type: IMPLANTABLE DEVICE | Status: FUNCTIONAL
Brand: EVOS

## 2022-09-12 DEVICE — EVOS 2.4MM X 18MM LOCKING SCREW T7 SELF-TAPPING
Type: IMPLANTABLE DEVICE | Status: FUNCTIONAL
Brand: EVOS

## 2022-09-12 DEVICE — EVOS 2.4MM X 12MM CORTEX SCREW T7 SELF-TAPPING
Type: IMPLANTABLE DEVICE | Status: FUNCTIONAL
Brand: EVOS

## 2022-09-12 RX ORDER — SODIUM CHLORIDE 0.9 % (FLUSH) 0.9 %
5-40 SYRINGE (ML) INJECTION EVERY 12 HOURS SCHEDULED
Status: DISCONTINUED | OUTPATIENT
Start: 2022-09-12 | End: 2022-09-12 | Stop reason: HOSPADM

## 2022-09-12 RX ORDER — HYDROCODONE BITARTRATE AND ACETAMINOPHEN 5; 325 MG/1; MG/1
1 TABLET ORAL EVERY 4 HOURS PRN
Status: DISCONTINUED | OUTPATIENT
Start: 2022-09-12 | End: 2022-09-12 | Stop reason: HOSPADM

## 2022-09-12 RX ORDER — PROPOFOL 10 MG/ML
INJECTION, EMULSION INTRAVENOUS PRN
Status: DISCONTINUED | OUTPATIENT
Start: 2022-09-12 | End: 2022-09-12 | Stop reason: SDUPTHER

## 2022-09-12 RX ORDER — ONDANSETRON 2 MG/ML
INJECTION INTRAMUSCULAR; INTRAVENOUS PRN
Status: DISCONTINUED | OUTPATIENT
Start: 2022-09-12 | End: 2022-09-12 | Stop reason: SDUPTHER

## 2022-09-12 RX ORDER — LABETALOL 20 MG/4 ML (5 MG/ML) INTRAVENOUS SYRINGE
10
Status: DISCONTINUED | OUTPATIENT
Start: 2022-09-12 | End: 2022-09-12 | Stop reason: HOSPADM

## 2022-09-12 RX ORDER — LORAZEPAM 2 MG/ML
0.5 INJECTION INTRAMUSCULAR
Status: DISCONTINUED | OUTPATIENT
Start: 2022-09-12 | End: 2022-09-12 | Stop reason: HOSPADM

## 2022-09-12 RX ORDER — SODIUM CHLORIDE 0.9 % (FLUSH) 0.9 %
5-40 SYRINGE (ML) INJECTION PRN
Status: DISCONTINUED | OUTPATIENT
Start: 2022-09-12 | End: 2022-09-12 | Stop reason: HOSPADM

## 2022-09-12 RX ORDER — OXYCODONE HYDROCHLORIDE 5 MG/1
10 TABLET ORAL PRN
Status: COMPLETED | OUTPATIENT
Start: 2022-09-12 | End: 2022-09-12

## 2022-09-12 RX ORDER — OXYCODONE HYDROCHLORIDE 5 MG/1
5 TABLET ORAL PRN
Status: COMPLETED | OUTPATIENT
Start: 2022-09-12 | End: 2022-09-12

## 2022-09-12 RX ORDER — FENTANYL CITRATE 50 UG/ML
50 INJECTION, SOLUTION INTRAMUSCULAR; INTRAVENOUS EVERY 5 MIN PRN
Status: DISCONTINUED | OUTPATIENT
Start: 2022-09-12 | End: 2022-09-12 | Stop reason: HOSPADM

## 2022-09-12 RX ORDER — SODIUM CHLORIDE 9 MG/ML
INJECTION, SOLUTION INTRAVENOUS PRN
Status: DISCONTINUED | OUTPATIENT
Start: 2022-09-12 | End: 2022-09-12 | Stop reason: HOSPADM

## 2022-09-12 RX ORDER — FENTANYL CITRATE 50 UG/ML
25 INJECTION, SOLUTION INTRAMUSCULAR; INTRAVENOUS EVERY 5 MIN PRN
Status: DISCONTINUED | OUTPATIENT
Start: 2022-09-12 | End: 2022-09-12 | Stop reason: HOSPADM

## 2022-09-12 RX ORDER — HYDROCODONE BITARTRATE AND ACETAMINOPHEN 5; 325 MG/1; MG/1
1 TABLET ORAL
Qty: 30 TABLET | Refills: 0 | Status: SHIPPED | OUTPATIENT
Start: 2022-09-12 | End: 2022-09-19

## 2022-09-12 RX ORDER — ONDANSETRON 2 MG/ML
4 INJECTION INTRAMUSCULAR; INTRAVENOUS
Status: DISCONTINUED | OUTPATIENT
Start: 2022-09-12 | End: 2022-09-12 | Stop reason: HOSPADM

## 2022-09-12 RX ORDER — ACETAMINOPHEN 325 MG/1
650 TABLET ORAL ONCE
Status: DISCONTINUED | OUTPATIENT
Start: 2022-09-12 | End: 2022-09-12 | Stop reason: HOSPADM

## 2022-09-12 RX ORDER — DIPHENHYDRAMINE HYDROCHLORIDE 50 MG/ML
12.5 INJECTION INTRAMUSCULAR; INTRAVENOUS
Status: DISCONTINUED | OUTPATIENT
Start: 2022-09-12 | End: 2022-09-12 | Stop reason: HOSPADM

## 2022-09-12 RX ORDER — DROPERIDOL 2.5 MG/ML
0.62 INJECTION, SOLUTION INTRAMUSCULAR; INTRAVENOUS
Status: DISCONTINUED | OUTPATIENT
Start: 2022-09-12 | End: 2022-09-12 | Stop reason: HOSPADM

## 2022-09-12 RX ORDER — MORPHINE SULFATE 2 MG/ML
4 INJECTION, SOLUTION INTRAMUSCULAR; INTRAVENOUS
Status: DISCONTINUED | OUTPATIENT
Start: 2022-09-12 | End: 2022-09-12 | Stop reason: HOSPADM

## 2022-09-12 RX ORDER — SODIUM CHLORIDE 9 MG/ML
INJECTION, SOLUTION INTRAVENOUS CONTINUOUS
Status: DISCONTINUED | OUTPATIENT
Start: 2022-09-12 | End: 2022-09-12 | Stop reason: HOSPADM

## 2022-09-12 RX ORDER — BUPIVACAINE HYDROCHLORIDE 2.5 MG/ML
INJECTION, SOLUTION EPIDURAL; INFILTRATION; INTRACAUDAL PRN
Status: DISCONTINUED | OUTPATIENT
Start: 2022-09-12 | End: 2022-09-12 | Stop reason: ALTCHOICE

## 2022-09-12 RX ORDER — MORPHINE SULFATE 2 MG/ML
2 INJECTION, SOLUTION INTRAMUSCULAR; INTRAVENOUS
Status: DISCONTINUED | OUTPATIENT
Start: 2022-09-12 | End: 2022-09-12 | Stop reason: HOSPADM

## 2022-09-12 RX ORDER — LIDOCAINE HYDROCHLORIDE 20 MG/ML
INJECTION, SOLUTION INFILTRATION; PERINEURAL PRN
Status: DISCONTINUED | OUTPATIENT
Start: 2022-09-12 | End: 2022-09-12 | Stop reason: SDUPTHER

## 2022-09-12 RX ORDER — KETOROLAC TROMETHAMINE 30 MG/ML
INJECTION, SOLUTION INTRAMUSCULAR; INTRAVENOUS PRN
Status: DISCONTINUED | OUTPATIENT
Start: 2022-09-12 | End: 2022-09-12 | Stop reason: SDUPTHER

## 2022-09-12 RX ORDER — HYDROCODONE BITARTRATE AND ACETAMINOPHEN 5; 325 MG/1; MG/1
2 TABLET ORAL EVERY 4 HOURS PRN
Status: DISCONTINUED | OUTPATIENT
Start: 2022-09-12 | End: 2022-09-12 | Stop reason: HOSPADM

## 2022-09-12 RX ORDER — DEXAMETHASONE SODIUM PHOSPHATE 4 MG/ML
INJECTION, SOLUTION INTRA-ARTICULAR; INTRALESIONAL; INTRAMUSCULAR; INTRAVENOUS; SOFT TISSUE PRN
Status: DISCONTINUED | OUTPATIENT
Start: 2022-09-12 | End: 2022-09-12 | Stop reason: SDUPTHER

## 2022-09-12 RX ORDER — FENTANYL CITRATE 50 UG/ML
INJECTION, SOLUTION INTRAMUSCULAR; INTRAVENOUS PRN
Status: DISCONTINUED | OUTPATIENT
Start: 2022-09-12 | End: 2022-09-12 | Stop reason: SDUPTHER

## 2022-09-12 RX ORDER — CHOLECALCIFEROL (VITAMIN D3) 1250 MCG
1 CAPSULE ORAL
COMMUNITY

## 2022-09-12 RX ORDER — ONDANSETRON 2 MG/ML
4 INJECTION INTRAMUSCULAR; INTRAVENOUS EVERY 6 HOURS PRN
Status: DISCONTINUED | OUTPATIENT
Start: 2022-09-12 | End: 2022-09-12 | Stop reason: HOSPADM

## 2022-09-12 RX ORDER — IPRATROPIUM BROMIDE AND ALBUTEROL SULFATE 2.5; .5 MG/3ML; MG/3ML
1 SOLUTION RESPIRATORY (INHALATION)
Status: DISCONTINUED | OUTPATIENT
Start: 2022-09-12 | End: 2022-09-12 | Stop reason: HOSPADM

## 2022-09-12 RX ADMIN — LIDOCAINE HYDROCHLORIDE 60 MG: 20 INJECTION, SOLUTION INFILTRATION; PERINEURAL at 09:28

## 2022-09-12 RX ADMIN — SODIUM CHLORIDE: 9 INJECTION, SOLUTION INTRAVENOUS at 08:24

## 2022-09-12 RX ADMIN — FENTANYL CITRATE 50 MCG: 50 INJECTION, SOLUTION INTRAMUSCULAR; INTRAVENOUS at 09:28

## 2022-09-12 RX ADMIN — OXYCODONE 10 MG: 5 TABLET ORAL at 10:40

## 2022-09-12 RX ADMIN — KETOROLAC TROMETHAMINE 30 MG: 30 INJECTION, SOLUTION INTRAMUSCULAR; INTRAVENOUS at 09:58

## 2022-09-12 RX ADMIN — PROPOFOL 150 MG: 10 INJECTION, EMULSION INTRAVENOUS at 09:28

## 2022-09-12 RX ADMIN — FENTANYL CITRATE 50 MCG: 50 INJECTION, SOLUTION INTRAMUSCULAR; INTRAVENOUS at 09:51

## 2022-09-12 RX ADMIN — CEFAZOLIN 2000 MG: 10 INJECTION, POWDER, FOR SOLUTION INTRAVENOUS at 09:38

## 2022-09-12 RX ADMIN — DEXAMETHASONE SODIUM PHOSPHATE 8 MG: 4 INJECTION, SOLUTION INTRAMUSCULAR; INTRAVENOUS at 09:28

## 2022-09-12 RX ADMIN — ONDANSETRON 4 MG: 2 INJECTION INTRAMUSCULAR; INTRAVENOUS at 09:55

## 2022-09-12 ASSESSMENT — PAIN DESCRIPTION - DESCRIPTORS: DESCRIPTORS: ACHING;NAGGING

## 2022-09-12 ASSESSMENT — PAIN DESCRIPTION - LOCATION: LOCATION: ARM

## 2022-09-12 ASSESSMENT — PAIN SCALES - GENERAL
PAINLEVEL_OUTOF10: 3
PAINLEVEL_OUTOF10: 7
PAINLEVEL_OUTOF10: 3
PAINLEVEL_OUTOF10: 2

## 2022-09-12 ASSESSMENT — PAIN DESCRIPTION - ORIENTATION: ORIENTATION: LEFT

## 2022-09-12 ASSESSMENT — PAIN - FUNCTIONAL ASSESSMENT: PAIN_FUNCTIONAL_ASSESSMENT: 0-10

## 2022-09-12 ASSESSMENT — ENCOUNTER SYMPTOMS: SHORTNESS OF BREATH: 1

## 2022-09-12 NOTE — OP NOTE
Operative Note      Patient: Cristal Valera  YOB: 1960  MRN: [id number]    Date of Procedure: 9/12/2022    Pre-Op Diagnosis: Left distal radius fracture closed displaced Colles' type    Post-Op Diagnosis: Same       Procedure(s):  ORIF Left Distal Radius    Surgeon(s):  Dakota Trevizo MD    Assistant:   Priti Bartlett PA-C    Anesthesia: General    Estimated Blood Loss (mL): less than 50     Complications: None    Specimens:   * No specimens in log *    Implants:  Implant Name Type Inv. Item Serial No.  Lot No. LRB No. Used Action   PLATE BNE STD 48 MM DSTL RAD VOLAR LT 3 HOLE SS EVOS - [id number]  PLATE BNE STD 48 MM DSTL RAD VOLAR LT 3 HOLE SS EVOS  [facility]  Left 1 Implanted   SCREW BONE L12MM THRD DIA2. 4MM HD DIA3. 8MM COR DIA1. 6MM - [id number]  SCREW BONE L12MM THRD DIA2. 4MM HD DIA3. 8MM COR DIA1. 6MM  [facility]  Left 2 Implanted   SCREW BNE LCK 2.4X18 MM T7 DRVR STRL EVOS - [id number]  SCREW BNE LCK 2.4X18 MM T7 DRVR STRL EVOS  [facility]  Left 4 Implanted   SCREW BNE LCK 2.4X16 MM T7 DRVR STRL EVOS - [id number]  SCREW BNE LCK 2.4X16 MM T7 DRVR STRL EVOS  [facility]  Left 2 Implanted   SCREW BNE LCK 2.4X12 MM T7 DRVR STRL EVOS - [id number]  SCREW BNE LCK 2.4X12 MM T7 DRVR STRL EVOS  [facility]  Left 1 Implanted         Drains: * No LDAs found *    Findings: Confirmed    Detailed Description of Procedure: Indications  This is a 76-year-old gentleman history injury to his left wrist.  Sustained a distal radius fracture Colles' type significant posterior comminution. Felt he would benefit from operative treatment to stabilize the fracture. Patient agreed. Narrative  Patient taken the operating room underwent a general anesthetic. Left upper extremity was prepped draped normal sterile fashion. Timeout was taken consent was confirmed. He received 2 g of Ancef. Tourniquet was inflated 250 mmHg. Started with a volar approach. Skin knife electrocautery down the flexor carpi radialis tendon sheath. Tendon sheath was then opened. Incised the deep fascia elevated off the musculature. Placed a 3 hole [employer] distal radial locking plate. Applied a dorsally applied force to correct angulation. Placed a couple nonlocking screws into the metaphysis and diaphysis. Then a series of locking screws distally. Remove the guide and additional locking screw was placed as well. X-rays demonstrate fracture be in satisfactory alignment hardware in satisfactory position. Tourniquet was let down. Bleeders were cauterized. Wound was closed with 2-0 Vicryl and 3-0 nylon. Dermabond was applied. Dry dressings were applied. Elizabeth Mcnealts up her splint was then applied. Patient was then awakened and taken to recovery room in good condition.     Postoperative plan  See him in the office in 3 weeks x-rays 2 views of the wrist out of the cock-up wrist splint and likely suture removal.    Electronically signed by Hussein Prescott MD on 9/12/2022 at 10:10 AM

## 2022-09-12 NOTE — ANESTHESIA POSTPROCEDURE EVALUATION
Department of Anesthesiology  Postprocedure Note    Patient: Ford Mai  MRN: [id number]  YOB: 1960  Date of evaluation: 9/12/2022      Procedure Summary     Date: 09/12/22 Room / Location: [facility] Norma Chiu    Anesthesia Start: 4582 Anesthesia Stop: 1021    Procedure: ORIF Left Distal Radius (Left: Arm Lower) Diagnosis:       Closed fracture of distal end of left radius with routine healing, unspecified fracture morphology, subsequent encounter      (Closed fracture of distal end of left radius with routine healing, unspecified fracture morphology, subsequent encounter [S52.502D])    Surgeons: Jaylan Fisher MD Responsible Provider: Jocelin Camacho DO    Anesthesia Type: general ASA Status: 3          Anesthesia Type: No value filed.     Estuardo Phase I: Estuardo Score: 10    Estuardo Phase II: Estuardo Score: 10      Anesthesia Post Evaluation    Patient location during evaluation: PACU  Patient participation: complete - patient participated  Level of consciousness: awake  Airway patency: patent  Nausea & Vomiting: no nausea  Complications: no  Cardiovascular status: hemodynamically stable  Respiratory status: acceptable  Hydration status: euvolemic

## 2022-09-12 NOTE — ANESTHESIA PRE PROCEDURE
Department of Anesthesiology  Preprocedure Note       Name:  Ann-Marie Leroy   Age:  60 y.o.  :  1960                                          MRN:  [id number]         Date:  2022      Surgeon: Kalie Gastelum):  Kirit De Dios MD    Procedure: Procedure(s):  ORIF Left Distal Radius    Medications prior to admission:   Prior to Admission medications    Medication Sig Start Date End Date Taking? Authorizing Provider   Cholecalciferol (VITAMIN D3) 1.25 MG (83948 UT) CAPS Take 1 each by mouth   Yes Historical Provider, MD   isosorbide mononitrate (IMDUR) 60 MG extended release tablet TAKE 1 TABLET BY MOUTH EVERY DAY 3/8/22   Stan Rainey MD   HYDROcodone-acetaminophen (NORCO) 5-325 MG per tablet Take 1 tablet by mouth every 6 hours as needed for Pain. Patient not taking: Reported on 2022    Historical Provider, MD   famotidine (PEPCID) 20 MG tablet Take 1 tablet by mouth 2 times daily 3/4/20   Dann Nieves MD   metoprolol tartrate (LOPRESSOR) 25 MG tablet Take 1 tablet by mouth 2 times daily 3/4/20   Dann Nieves MD   nitroGLYCERIN (NITROSTAT) 0.4 MG SL tablet up to max of 3 total doses. If no relief after 1 dose, call 911. 3/4/20   Dann Nieves MD   albuterol sulfate  (90 Base) MCG/ACT inhaler Inhale 2 puffs into the lungs 4 times daily as needed for Wheezing 3/4/20   Dann Nieves MD   simvastatin (ZOCOR) 20 MG tablet Take 20 mg by mouth nightly    Historical Provider, MD   escitalopram (LEXAPRO) 20 MG tablet Take 20 mg by mouth daily    Historical Provider, MD   gabapentin (NEURONTIN) 300 MG capsule Take 300 mg by mouth 3 times daily.   Patient not taking: Reported on 2022    Historical Provider, MD   aspirin 81 MG tablet Take 81 mg by mouth daily    Historical Provider, MD   pioglitazone (ACTOS) 45 MG tablet Take 45 mg by mouth daily    Historical Provider, MD   GLUCOSAMINE-CHONDROITIN PO Take by mouth    Historical Provider, MD   Semaglutide (OZEMPIC, 0.25 OR 0.5 MG/DOSE, SC) Inject into the skin Pharmacy to clarify    Historical Provider, MD       Current medications:    Current Facility-Administered Medications   Medication Dose Route Frequency Provider Last Rate Last Admin    sodium chloride flush 0.9 % injection 5-40 mL  5-40 mL IntraVENous PRN Latha Freedman MD        0.9 % sodium chloride infusion   IntraVENous Continuous Latha Freedman  mL/hr at 22 0824 New Bag at 22 0824    ceFAZolin (ANCEF) 2000 mg in dextrose 5 % 50 mL IVPB  2,000 mg IntraVENous On Call to Minna Sharma MD           Allergies: Allergies   Allergen Reactions    Seasonal        Problem List:    Patient Active Problem List   Diagnosis Code    Atypical chest pain R07.89    Hypertension I10    Hyperlipidemia E78.5    Hyperkalemia E87.5    DM (diabetes mellitus) ([address] E11.9    Dyspnea on exertion R06.00    Unstable angina (HCC) I20.0    Abnormal stress test R94.39    Macrocytic anemia D53.9    Chronic depression F32. A       Past Medical History:        Diagnosis Date    Anxiety     Diabetes mellitus [address] [address]     Hyperlipidemia     Hypertension     Neuropathy        Past Surgical History:        Procedure Laterality Date    BACK SURGERY      CATARACT REMOVAL Bilateral         CHOLECYSTECTOMY      COLONOSCOPY      CORONARY ANGIOPLASTY      no stents; dr Priya Stacy Left         VASECTOMY      VASECTOMY REVERSAL         Social History:    Social History     Tobacco Use    Smoking status: Former     Packs/day: 3.00     Types: Cigarettes     Quit date:      Years since quittin.    Smokeless tobacco: Never   Substance Use Topics    Alcohol use: Yes     Comment: SOCIAL                                Counseling given: Not Answered      Vital Signs (Current):   Vitals:    22 0753 22 0800   BP: (!) 141/72    Pulse: 66    Resp: 18    Temp: (!) 95.9 °F (35.5 °C)    TempSrc: Temporal    SpO2: 99%    Weight:  209 lb 3.2 oz (94.9 kg)   Height:  5' 9\" (1.753 m)                                              BP Readings from Last 3 Encounters:   09/12/22 (!) 141/72   07/20/21 138/82   03/11/21 124/78       NPO Status: Time of last liquid consumption: 2300 (sip of water with medication at 0600 this am)                        Time of last solid consumption: 2200                        Date of last liquid consumption: 09/11/22                        Date of last solid food consumption: 09/11/22    BMI:   Wt Readings from Last 3 Encounters:   09/12/22 209 lb 3.2 oz (94.9 kg)   07/20/21 220 lb (99.8 kg)   03/11/21 213 lb (96.6 kg)     Body mass index is 30.89 kg/m². CBC:   Lab Results   Component Value Date/Time    WBC 4.2 03/04/2020 06:05 AM    RBC 4.00 03/04/2020 06:05 AM    HGB 12.7 03/04/2020 06:05 AM    HCT 40.3 03/04/2020 06:05 AM    .8 03/04/2020 06:05 AM     03/04/2020 06:05 AM       CMP:   Lab Results   Component Value Date/Time     03/04/2020 06:05 AM    K 5.1 03/04/2020 06:05 AM    K 4.2 03/03/2020 05:32 AM     03/04/2020 06:05 AM    CO2 24 03/04/2020 06:05 AM    BUN 12 03/04/2020 06:05 AM    CREATININE 0.8 03/04/2020 06:05 AM    LABGLOM >90 03/04/2020 06:05 AM    GLUCOSE 127 03/04/2020 06:05 AM    PROT 6.0 02/29/2020 06:03 PM    CALCIUM 9.2 03/04/2020 06:05 AM    BILITOT 0.3 02/29/2020 06:03 PM    ALKPHOS 34 02/29/2020 06:03 PM    AST 12 02/29/2020 06:03 PM    ALT 18 02/29/2020 06:03 PM       POC Tests: No results for input(s): POCGLU, POCNA, POCK, POCCL, POCBUN, POCHEMO, POCHCT in the last 72 hours. Coags:   Lab Results   Component Value Date/Time    INR 1.07 03/03/2020 05:32 AM       HCG (If Applicable): No results found for: PREGTESTUR, PREGSERUM, HCG, HCGQUANT     ABGs: No results found for: PHART, PO2ART, GZX1RPA, SNC2LQN, BEART, R3JTBZPI     Type & Screen (If Applicable):  Lab Results   Component Value Date    LABRH POS 03/03/2020       Drug/Infectious Status (If Applicable):   No results found for: HIV, HEPCAB    COVID-19 Screening (If Applicable):   Lab Results   Component Value Date/Time    COVID19 NOT DETECTED 07/13/2020 09:40 AM           Anesthesia Evaluation  Patient summary reviewed and Nursing notes reviewed  Airway: Mallampati: II          Dental:          Pulmonary: breath sounds clear to auscultation  (+) shortness of breath: no interval change,                             Cardiovascular:  Exercise tolerance: no interval change,   (+) hypertension:, angina:, DIAS:,       ECG reviewed  Rhythm: regular  Rate: normal  Echocardiogram reviewed                  Neuro/Psych:   (+) psychiatric history:            GI/Hepatic/Renal:   (+) GERD: well controlled,           Endo/Other:    (+) Diabeteswell controlled, , .                 Abdominal:             Vascular: Other Findings:           Anesthesia Plan      general     ASA 3       Induction: intravenous. MIPS: Postoperative opioids intended and Prophylactic antiemetics administered. Anesthetic plan and risks discussed with patient and spouse.                         Karla [place] DO [place]   9/12/2022

## 2022-09-12 NOTE — DISCHARGE INSTRUCTIONS
Thelma Rg MD       DIET INSTRUCTIONS:  Diet as tolerated. Start with a light diet and progress to your normal diet as you feel like eating. ACTIVITY INSTRUCTIONS:  After receiving anesthesia, you can be drowsy. Do not drive or operate hazardous machinery for 24 hours. Rest today. Increase activity as tolerated. WOUND/DRESSING INSTRUCTIONS:  Always ensure you wash your hands before and after caring for the wound/dressing. Keep dressing clean and dry. OK to loosen ace wrap if gets too tight. Keep surgical dressing in place for first 2 days. Can remove after that time for hygiene. Can replace dressing with clean gauze and ace wrap. Keep stitches covered until follow up appointment. Can wash around surgical incision but don't get surgical incision/stitches wet. Do not submerge surgical incision in water. Do not place antibiotic ointment, lotion, creams on surgical incision. Smoking impairs adequate wound healing. If smoking , consider quitting. WEIGHTBEARING STATUS:    Non weightbearing to left arm  Elevating, icing and moving fingers will help with surgical swelling. MEDICATION INSTRUCTIONS:  Take pain medication as prescribed. See orders regarding resuming your home medications and any new medications. FOLLOW-UP CARE:  Call Junaid Spicer office [phone] for a 3 week follow up appointment for stitch removal and xrays      Call Dr Miky Mixon office with any concerns. If a medical emergerncy, please call 911 or go to your local emergency room.

## 2022-09-12 NOTE — PROGRESS NOTES
Pt returned to [facility] room 8. Vitals and assessment as charted. 0.9 infusing, @900ml to count from PACU. Pt has muffin and apple juice. Family at the bedside. Pt and family verbalized understanding of discharge criteria and call light use. Call light in reach.

## 2022-09-12 NOTE — H&P
History and Physical Update    Pt Name: Marco Hernandes  MRN: [id number]  YOB: 1960  Date of evaluation: 9/12/2022    [x] I have examined the patient and reviewed the H&P/Consult and there are no changes to the patient or plans.     [] I have examined the patient and reviewed the H&P/Consult and have noted the following changes:        Latha Freedman MD   Electronically signed 9/12/2022 at 9:10 AM

## 2022-09-12 NOTE — PROGRESS NOTES
Pt admitted to [facility] room 8 and oriented to unit. SCD sleeves applied. Nares swabbed. Pt verbalized permission for first name, last initial and physicians name on white board. SDS board and discharge criteria explained, pt and family verbalized understanding. Pt denies thoughts of harming self or others. Call light in reach. Family at the bedside.

## 2022-09-12 NOTE — PROGRESS NOTES
1018 Arouses to name on arrival to PACU , LMA removed and O2 3L NC applied ,HOB elevated   1025 awake and oriented pt c/o # 7 nagging ache pain to Lt arm  1040 medicated with Roxicodone 10 mg PO , O2 off  1055 pt states pain tolerable , denies any needs  1100 meets criteria for discharge , transported to [facility]

## 2024-01-21 NOTE — PROGRESS NOTES
changes to meds.   HLD-tolerating statin so far. No side effects.      Disposition:   [facility]. Echo.   Follow up with Dr Gamez as scheduled or sooner if needed

## 2024-01-24 ENCOUNTER — OFFICE VISIT (OUTPATIENT)
Dept: CARDIOLOGY CLINIC | Age: 64
End: 2024-01-24
Payer: COMMERCIAL

## 2024-01-24 VITALS
BODY MASS INDEX: 32.26 KG/M2 | HEIGHT: 69 IN | HEART RATE: 69 BPM | DIASTOLIC BLOOD PRESSURE: 78 MMHG | WEIGHT: 217.8 LBS | SYSTOLIC BLOOD PRESSURE: 121 MMHG

## 2024-01-24 DIAGNOSIS — I20.89 ANGINA OF EFFORT: Primary | ICD-10-CM

## 2024-01-24 DIAGNOSIS — E78.5 DYSLIPIDEMIA: ICD-10-CM

## 2024-01-24 DIAGNOSIS — R06.02 SHORTNESS OF BREATH: ICD-10-CM

## 2024-01-24 DIAGNOSIS — I10 PRIMARY HYPERTENSION: ICD-10-CM

## 2024-01-24 PROCEDURE — 3078F DIAST BP <80 MM HG: CPT | Performed by: STUDENT IN AN ORGANIZED HEALTH CARE EDUCATION/TRAINING PROGRAM

## 2024-01-24 PROCEDURE — 99214 OFFICE O/P EST MOD 30 MIN: CPT | Performed by: STUDENT IN AN ORGANIZED HEALTH CARE EDUCATION/TRAINING PROGRAM

## 2024-01-24 PROCEDURE — 93000 ELECTROCARDIOGRAM COMPLETE: CPT | Performed by: STUDENT IN AN ORGANIZED HEALTH CARE EDUCATION/TRAINING PROGRAM

## 2024-01-24 PROCEDURE — 3074F SYST BP LT 130 MM HG: CPT | Performed by: STUDENT IN AN ORGANIZED HEALTH CARE EDUCATION/TRAINING PROGRAM

## 2024-01-24 RX ORDER — GABAPENTIN 300 MG/1
300 CAPSULE ORAL
COMMUNITY
Start: 2023-10-23

## 2024-01-24 RX ORDER — EMPAGLIFLOZIN 25 MG/1
25 TABLET, FILM COATED ORAL DAILY
COMMUNITY
Start: 2023-11-18

## 2024-02-02 ENCOUNTER — HOSPITAL ENCOUNTER (OUTPATIENT)
Dept: NUCLEAR MEDICINE | Age: 64
Discharge: HOME | End: 2024-02-02
Payer: COMMERCIAL

## 2024-02-02 ENCOUNTER — HOSPITAL ENCOUNTER (OUTPATIENT)
Age: 64
End: 2024-02-02
Payer: COMMERCIAL

## 2024-02-02 DIAGNOSIS — E78.5 DYSLIPIDEMIA: ICD-10-CM

## 2024-02-02 DIAGNOSIS — R06.02 SHORTNESS OF BREATH: ICD-10-CM

## 2024-02-02 DIAGNOSIS — I10 PRIMARY HYPERTENSION: ICD-10-CM

## 2024-02-02 DIAGNOSIS — I20.89 ANGINA OF EFFORT: ICD-10-CM

## 2024-02-02 LAB
ECHO AV CUSP MM: 2.1 CM
ECHO AV PEAK GRADIENT: 4 MMHG
ECHO AV PEAK VELOCITY: 1 M/S
ECHO AV VELOCITY RATIO: 0.8
ECHO LA AREA 2C: 17.4 CM2
ECHO LA AREA 4C: 15.6 CM2
ECHO LA DIAMETER: 4.2 CM
ECHO LA MAJOR AXIS: 4.9 CM
ECHO LA MINOR AXIS: 5 CM
ECHO LA VOL BP: 44 ML (ref 18–58)
ECHO LA VOL MOD A2C: 48 ML (ref 18–58)
ECHO LA VOL MOD A4C: 40 ML (ref 18–58)
ECHO LV E' LATERAL VELOCITY: 10 CM/S
ECHO LV E' SEPTAL VELOCITY: 5 CM/S
ECHO LV FRACTIONAL SHORTENING: 32 % (ref 28–44)
ECHO LV INTERNAL DIMENSION DIASTOLIC: 5.6 CM (ref 4.2–5.9)
ECHO LV INTERNAL DIMENSION SYSTOLIC: 3.8 CM
ECHO LV IVSD: 0.8 CM (ref 0.6–1)
ECHO LV MASS 2D: 191.6 G (ref 88–224)
ECHO LV POSTERIOR WALL DIASTOLIC: 1 CM (ref 0.6–1)
ECHO LV RELATIVE WALL THICKNESS RATIO: 0.36
ECHO LVOT PEAK GRADIENT: 3 MMHG
ECHO LVOT PEAK VELOCITY: 0.8 M/S
ECHO MV A VELOCITY: 0.65 M/S
ECHO MV E DECELERATION TIME (DT): 243 MS
ECHO MV E VELOCITY: 0.55 M/S
ECHO MV E/A RATIO: 0.85
ECHO MV E/E' LATERAL: 5.5
ECHO MV E/E' RATIO (AVERAGED): 8.25
ECHO MV REGURGITANT PEAK GRADIENT: 61 MMHG
ECHO MV REGURGITANT PEAK VELOCITY: 3.9 M/S
ECHO PV MAX VELOCITY: 1 M/S
ECHO PV PEAK GRADIENT: 4 MMHG
ECHO RV INTERNAL DIMENSION: 2.8 CM
ECHO TV E WAVE: 0.4 M/S
ECHO TV REGURGITANT MAX VELOCITY: 2.54 M/S
ECHO TV REGURGITANT PEAK GRADIENT: 26 MMHG
NUC REST EJECTION FRACTION: 61 %
NUC STRESS EJECTION FRACTION: 61 %
STRESS BASELINE DIAS BP: 63 MMHG
STRESS BASELINE HR: 61 BPM
STRESS BASELINE SYS BP: 123 MMHG
STRESS STAGE 1 BP: NORMAL MMHG
STRESS STAGE 1 DURATION: 1 MIN:SEC
STRESS STAGE 1 HR: 85 BPM
STRESS STAGE 2 BP: NORMAL MMHG
STRESS STAGE 2 DURATION: 1 MIN:SEC
STRESS STAGE 2 HR: 86 BPM
STRESS STAGE 3 BP: NORMAL MMHG
STRESS STAGE 3 DURATION: 1 MIN:SEC
STRESS STAGE 3 HR: 80 BPM
STRESS STAGE RECOVERY 1 BP: NORMAL MMHG
STRESS STAGE RECOVERY 1 DURATION: 1 MIN:SEC
STRESS STAGE RECOVERY 1 HR: 79 BPM
STRESS STAGE RECOVERY 2 BP: NORMAL MMHG
STRESS STAGE RECOVERY 2 DURATION: 1 MIN:SEC
STRESS STAGE RECOVERY 2 HR: 76 BPM
STRESS STAGE RECOVERY 3 BP: NORMAL MMHG
STRESS STAGE RECOVERY 3 DURATION: 1 MIN:SEC
STRESS STAGE RECOVERY 3 HR: 74 BPM
STRESS STAGE RECOVERY 4 BP: NORMAL MMHG
STRESS STAGE RECOVERY 4 DURATION: 2 MIN:SEC
STRESS STAGE RECOVERY 4 HR: 72 BPM
STRESS TARGET HR: 156 BPM

## 2024-02-02 PROCEDURE — A9500 TC99M SESTAMIBI: HCPCS | Performed by: STUDENT IN AN ORGANIZED HEALTH CARE EDUCATION/TRAINING PROGRAM

## 2024-02-02 PROCEDURE — 6360000002 HC RX W HCPCS: Performed by: NUCLEAR MEDICINE

## 2024-02-02 PROCEDURE — 93306 TTE W/DOPPLER COMPLETE: CPT

## 2024-02-02 PROCEDURE — 78452 HT MUSCLE IMAGE SPECT MULT: CPT

## 2024-02-02 PROCEDURE — 93017 CV STRESS TEST TRACING ONLY: CPT

## 2024-02-02 PROCEDURE — 3430000000 HC RX DIAGNOSTIC RADIOPHARMACEUTICAL: Performed by: STUDENT IN AN ORGANIZED HEALTH CARE EDUCATION/TRAINING PROGRAM

## 2024-02-02 PROCEDURE — 93306 TTE W/DOPPLER COMPLETE: CPT | Performed by: NUCLEAR MEDICINE

## 2024-02-02 RX ORDER — TETRAKIS(2-METHOXYISOBUTYLISOCYANIDE)COPPER(I) TETRAFLUOROBORATE 1 MG/ML
34.5 INJECTION, POWDER, LYOPHILIZED, FOR SOLUTION INTRAVENOUS
Status: COMPLETED | OUTPATIENT
Start: 2024-02-02 | End: 2024-02-02

## 2024-02-02 RX ORDER — REGADENOSON 0.08 MG/ML
0.4 INJECTION, SOLUTION INTRAVENOUS
Status: COMPLETED | OUTPATIENT
Start: 2024-02-02 | End: 2024-02-02

## 2024-02-02 RX ORDER — TETRAKIS(2-METHOXYISOBUTYLISOCYANIDE)COPPER(I) TETRAFLUOROBORATE 1 MG/ML
9.9 INJECTION, POWDER, LYOPHILIZED, FOR SOLUTION INTRAVENOUS
Status: COMPLETED | OUTPATIENT
Start: 2024-02-02 | End: 2024-02-02

## 2024-02-02 RX ADMIN — Medication 9.9 MILLICURIE: at 10:16

## 2024-02-02 RX ADMIN — REGADENOSON 0.4 MG: 0.08 INJECTION, SOLUTION INTRAVENOUS at 11:16

## 2024-02-02 RX ADMIN — Medication 34.5 MILLICURIE: at 11:17

## 2024-02-05 ENCOUNTER — TELEPHONE (OUTPATIENT)
Dept: CARDIOLOGY CLINIC | Age: 64
End: 2024-02-05

## 2024-02-05 NOTE — TELEPHONE ENCOUNTER
Result note for Echo (TTE)     ----- Message from Connor Mortimer, PA-C sent at 2/5/2024  7:19 AM EST -----  Basically normal stress and echo. If his symptoms are recurrent or getting worse, sooner f/u than what he has scheduled.

## 2024-05-16 NOTE — PROGRESS NOTES
[facility] PROFESSIONAL SERVICES  [facility]   [address]   [address]   [place] [address]   Dept: [phone]   Dept Fax: [phone]   Loc: [phone]      Chief Complaint   Patient presents with    Follow-up     4 month. No cardiac complaints      Cardiologist:  Dr. noguera  62 yo male presents for 4 month f/u. Hx of atypical chest pain, mild CAD 50% Lcx, COVID, HTN, HLD.     Not much chest discomfort. Chagned GI meds and symptoms improved significantly. Rarely has any chest pressure, etc now. Able to toelrate all his usual activitie including exertional without issue. No dizzy or lightheaded symptoms.     General:   No fever, no chills, no weight loss, no fatigue  Pulmonary:    No dyspnea, no wheezing  Cardiac:    Denies recent chest pain, rare chest pressure   GI:     No nausea or vomiting, no abdominal pain  Neuro:    No dizziness or light headedness  Musculoskeletal:  No recent active issues  Extremities:   No edema      Past Medical History:   Diagnosis Date    Anxiety     Diabetes mellitus (HCC)     Hyperlipidemia     Hypertension     Neuropathy        Allergies   Allergen Reactions    Seasonal        Current Outpatient Medications   Medication Sig Dispense Refill    gabapentin (NEURONTIN) 300 MG capsule Take 1 capsule by mouth. Take 2 capsules by mouth 3 hours before bedtime      JARDIANCE 25 MG tablet Take 1 tablet by mouth daily      Cholecalciferol (VITAMIN D3) 1.25 MG (57231 UT) CAPS Take 1 capsule by mouth      isosorbide mononitrate (IMDUR) 60 MG extended release tablet TAKE 1 TABLET BY MOUTH EVERY DAY 90 tablet 0    famotidine (PEPCID) 20 MG tablet Take 1 tablet by mouth 2 times daily 60 tablet 3    metoprolol tartrate (LOPRESSOR) 25 MG tablet Take 1 tablet by mouth 2 times daily 60 tablet 3    nitroGLYCERIN (NITROSTAT) 0.4 MG SL tablet up to max of 3 total doses. If no relief after 1 dose, call 911. 25 tablet 3    simvastatin (ZOCOR) 20 MG tablet Take 1 tablet by mouth

## 2024-05-22 ENCOUNTER — OFFICE VISIT (OUTPATIENT)
Dept: CARDIOLOGY CLINIC | Age: 64
End: 2024-05-22
Payer: COMMERCIAL

## 2024-05-22 VITALS
DIASTOLIC BLOOD PRESSURE: 75 MMHG | HEART RATE: 72 BPM | WEIGHT: 207 LBS | SYSTOLIC BLOOD PRESSURE: 113 MMHG | BODY MASS INDEX: 30.66 KG/M2 | HEIGHT: 69 IN

## 2024-05-22 DIAGNOSIS — I10 ESSENTIAL HYPERTENSION: ICD-10-CM

## 2024-05-22 DIAGNOSIS — I25.10 CORONARY ARTERY DISEASE INVOLVING NATIVE CORONARY ARTERY OF NATIVE HEART WITHOUT ANGINA PECTORIS: Primary | ICD-10-CM

## 2024-05-22 PROCEDURE — 99214 OFFICE O/P EST MOD 30 MIN: CPT | Performed by: STUDENT IN AN ORGANIZED HEALTH CARE EDUCATION/TRAINING PROGRAM

## 2024-05-22 PROCEDURE — 3074F SYST BP LT 130 MM HG: CPT | Performed by: STUDENT IN AN ORGANIZED HEALTH CARE EDUCATION/TRAINING PROGRAM

## 2024-05-22 PROCEDURE — 3078F DIAST BP <80 MM HG: CPT | Performed by: STUDENT IN AN ORGANIZED HEALTH CARE EDUCATION/TRAINING PROGRAM

## 2025-04-25 ENCOUNTER — OFFICE VISIT (OUTPATIENT)
Dept: CARDIOLOGY CLINIC | Age: 65
End: 2025-04-25
Payer: MEDICARE

## 2025-04-25 VITALS
SYSTOLIC BLOOD PRESSURE: 108 MMHG | HEART RATE: 69 BPM | BODY MASS INDEX: 28.91 KG/M2 | HEIGHT: 69 IN | DIASTOLIC BLOOD PRESSURE: 68 MMHG | WEIGHT: 195.2 LBS

## 2025-04-25 DIAGNOSIS — E78.5 DYSLIPIDEMIA: ICD-10-CM

## 2025-04-25 DIAGNOSIS — I10 ESSENTIAL HYPERTENSION: Primary | ICD-10-CM

## 2025-04-25 DIAGNOSIS — I25.10 CORONARY ARTERY DISEASE INVOLVING NATIVE CORONARY ARTERY OF NATIVE HEART WITHOUT ANGINA PECTORIS: ICD-10-CM

## 2025-04-25 PROCEDURE — 3078F DIAST BP <80 MM HG: CPT | Performed by: NUCLEAR MEDICINE

## 2025-04-25 PROCEDURE — 93000 ELECTROCARDIOGRAM COMPLETE: CPT | Performed by: NUCLEAR MEDICINE

## 2025-04-25 PROCEDURE — 99214 OFFICE O/P EST MOD 30 MIN: CPT | Performed by: NUCLEAR MEDICINE

## 2025-04-25 PROCEDURE — 4004F PT TOBACCO SCREEN RCVD TLK: CPT | Performed by: NUCLEAR MEDICINE

## 2025-04-25 PROCEDURE — 1123F ACP DISCUSS/DSCN MKR DOCD: CPT | Performed by: NUCLEAR MEDICINE

## 2025-04-25 PROCEDURE — 3017F COLORECTAL CA SCREEN DOC REV: CPT | Performed by: NUCLEAR MEDICINE

## 2025-04-25 PROCEDURE — G8427 DOCREV CUR MEDS BY ELIG CLIN: HCPCS | Performed by: NUCLEAR MEDICINE

## 2025-04-25 PROCEDURE — 3074F SYST BP LT 130 MM HG: CPT | Performed by: NUCLEAR MEDICINE

## 2025-04-25 PROCEDURE — G8417 CALC BMI ABV UP PARAM F/U: HCPCS | Performed by: NUCLEAR MEDICINE

## 2025-04-25 NOTE — PROGRESS NOTES
[facility] [place] [facility]  [facility] CARDIOLOGY  [address]  [address]  [facility] [address]  Dept: [phone]  Dept Fax: [phone]  Loc: [phone]    Visit Date: 2025    Darek Tai is a 64 y.o. male who presents todayfor:  Chief Complaint   Patient presents with    Follow-up     1 year follow up.    Hypertension    Coronary Artery Disease    Hyperlipidemia   Not seen since   Cath   Mild CAD  No chest pain   No changes in breathing  No nitro  Stress test last year was okay   He is active  No dizziness  No syncope  On statins for hyperlipidemia  No issues with the the meds   DM is so so controlled      HPI:  HPI  Past Medical History:   Diagnosis Date    Anxiety     Diabetes mellitus (HCC)     Hyperlipidemia     Hypertension     Neuropathy       Past Surgical History:   Procedure Laterality Date    BACK SURGERY      CATARACT REMOVAL Bilateral         CHOLECYSTECTOMY      COLONOSCOPY      CORONARY ANGIOPLASTY      no stents; dr shaw     FOREARM SURGERY Left 2022    ORIF Left Distal Radius performed by Roberto Car MD at [facility] OR    GLAUCOMA SURGERY Left         VASECTOMY      VASECTOMY REVERSAL       Family History   Problem Relation Age of Onset    Lung Cancer Mother     Lung Cancer Father     Hypertension Maternal Grandfather      Social History     Tobacco Use    Smoking status: Former     Current packs/day: 0.00     Types: Cigarettes     Quit date:      Years since quittin.3    Smokeless tobacco: Never   Substance Use Topics    Alcohol use: Yes     Comment: SOCIAL      Current Outpatient Medications   Medication Sig Dispense Refill    gabapentin (NEURONTIN) 300 MG capsule Take 1 capsule by mouth. Take 2 capsules by mouth 3 hours before bedtime      JARDIANCE 25 MG tablet Take 1 tablet by mouth daily      Cholecalciferol (VITAMIN D3) 1.25 MG (36323 UT) CAPS Take 1 capsule by mouth      isosorbide mononitrate (IMDUR) 60 MG extended

## 2025-07-10 ENCOUNTER — HOSPITAL ENCOUNTER (OUTPATIENT)
Dept: GENERAL RADIOLOGY | Age: 65
Discharge: HOME | End: 2025-07-10
Payer: MEDICARE

## 2025-07-10 ENCOUNTER — HOSPITAL ENCOUNTER (OUTPATIENT)
Dept: CT IMAGING | Age: 65
Discharge: HOME | End: 2025-07-10
Payer: MEDICARE

## 2025-07-10 VITALS
WEIGHT: 190.8 LBS | HEIGHT: 69 IN | DIASTOLIC BLOOD PRESSURE: 76 MMHG | OXYGEN SATURATION: 100 % | RESPIRATION RATE: 18 BRPM | BODY MASS INDEX: 28.26 KG/M2 | SYSTOLIC BLOOD PRESSURE: 156 MMHG | TEMPERATURE: 96.6 F | HEART RATE: 64 BPM

## 2025-07-10 DIAGNOSIS — R52 PAIN: ICD-10-CM

## 2025-07-10 DIAGNOSIS — M54.12 RADICULOPATHY, CERVICAL: ICD-10-CM

## 2025-07-10 DIAGNOSIS — M51.362 OTHER INTERVERTEBRAL DISC DEGENERATION, LUMBAR REGION WITH DISCOGENIC BACK PAIN AND LOWER EXTREMITY PAIN: ICD-10-CM

## 2025-07-10 PROCEDURE — 72126 CT NECK SPINE W/DYE: CPT

## 2025-07-10 PROCEDURE — 6360000004 HC RX CONTRAST MEDICATION: Performed by: ORTHOPAEDIC SURGERY

## 2025-07-10 PROCEDURE — 62305 MYELOGRAPHY LUMBAR INJECTION: CPT

## 2025-07-10 PROCEDURE — 72132 CT LUMBAR SPINE W/DYE: CPT

## 2025-07-10 RX ADMIN — IOHEXOL 10 ML: 300 INJECTION, SOLUTION INTRAVENOUS at 10:12

## 2025-07-10 ASSESSMENT — PAIN SCALES - GENERAL
PAINLEVEL_OUTOF10: 0

## 2025-07-10 NOTE — DISCHARGE INSTRUCTIONS
MYELOGRAM DISCHARGE INSTRUCTION SHEET    Diet:   Regular diet as tolerated.    Encourage fluids - at least 8 ounces every 1 hour x 4, then 8 ounces every 2 hours until bedtime.    Activity: May walk around the house but not outside today.    Rest at home for remainder of today.    When lying down, the head and upper body must remain elevated 45 degrees relative to the lower body.      Sleep with 2 - 3 pillows.    If you get a headache that will not go away- lay flat.    No driving today.    Call your doctor if you should develop any headache, unrelieved vomiting, pain, weakness or numbness of extremities or go to the nearest emergency room.  Keep scheduled office appointment with your doctor.

## 2025-07-10 NOTE — PROGRESS NOTES
0900 pt arrives ambulatory for with spouse for myelogram. Procedure explained and questions answered. PT RIGHTS AND RESPONSIBILITIES OFFERED TO PT. Pt has held aspirin and lexapro for 5 days. Pt has been NPO since 7/9/25. Pt has numbness in left hand.   0937 pt to fluoro via bed.   1110 pt back from fluoro. Vitals stable. Pt denies new pain or numbness. Pt provided with crackers and beverage. Spouse at bedside. Bandaid on lower back clean, dry and intact. No bleeding drainage redness or swelling noted.  1137 vitals stable. Bandaid unchanged. Pt tolerating snack and beverage. Spouse at bedside. Pt denies new pain or numbness.   1215 pt discharged ambulatory with spouse with instructions with no complaints. Bandaid on lower back clean, dry and intact. No bleeding drainage redness or swelling noted. Pt denies new pain or numbness.                   __m__ Safety:       (Environmental)  [place]ell to environment  Ensure ID band is correct and in place/ allergy band as needed  Assess for fall risk  Initiate fall precautions as applicable (fall band, side rails, etc.)  Call light within reach  Bed in low position/ wheels locked    _m___ Pain:       Assess pain level and characteristics  Administer analgesics as ordered  Assess effectiveness of pain management and report to MD as needed    _m___ Knowledge Deficit:  Assess baseline knowledge  Provide teaching at level of understanding  Provide teaching via preferred learning method  Evaluate teaching effectiveness    _m___ Hemodynamic/Respiratory Status:       (Pre and Post Procedure Monitoring)  Assess/Monitor vital signs and LOC  Assess Baseline SpO2 prior to any sedation  Obtain weight/height  Assess vital signs/ LOC until patient meets discharge criteria  Monitor procedure site and notify MD of any issues

## 2025-07-31 ENCOUNTER — TELEPHONE (OUTPATIENT)
Dept: CARDIOLOGY CLINIC | Age: 65
End: 2025-07-31

## 2025-07-31 NOTE — TELEPHONE ENCOUNTER
Request for pre op clearance: Dr Arcos from [facility]  Requested by:   Date of surgery 9/3/2025 at [facility]  Procedure cervical fusion  Office phone # [phone]  Fax #  [phone]     Is the patient on anti-coag medication?   If yes, how many days does the surgeon want anti-coag medication held:     Date of last visit with cardiologist: 4/25/2025

## (undated) DEVICE — EVOS DISTAL RADIUS 1.8MM DRILL AO QC: Brand: EVOS

## (undated) DEVICE — 1010 S-DRAPE TOWEL DRAPE 10/BX: Brand: STERI-DRAPE™

## (undated) DEVICE — ADHESIVE SKIN CLSR 0.7ML TOP DERMBND ADV

## (undated) DEVICE — 450 ML BOTTLE OF 0.05% CHLORHEXIDINE GLUCONATE IN 99.95% STERILE WATER FOR IRRIGATION, USP AND APPLICATOR.: Brand: IRRISEPT ANTIMICROBIAL WOUND LAVAGE

## (undated) DEVICE — DRAPE,U/SHT,SPLIT,FILM,60X84,STERILE: Brand: MEDLINE

## (undated) DEVICE — DRAPE,EXTREMITY,89X128,STERILE: Brand: MEDLINE

## (undated) DEVICE — GLOVE SURG SZ 9 THK91MIL LTX FREE SYN POLYISOPRENE ANTI

## (undated) DEVICE — SUTURE ETHLN SZ 3-0 L18IN NONABSORBABLE BLK FS-1 L24MM 3/8 663H

## (undated) DEVICE — PACK-MAJOR

## (undated) DEVICE — ROYAL SILK SURGICAL GOWN, XXL: Brand: CONVERTORS

## (undated) DEVICE — BASIC SINGLE BASIN BTC-LF: Brand: MEDLINE INDUSTRIES, INC.

## (undated) DEVICE — PENCIL SMK EVAC ALL IN 1 DSGN ENH VISIBILITY IMPROVED AIR

## (undated) DEVICE — APPLICATOR MEDICATED 26 CC SOLUTION HI LT ORNG CHLORAPREP

## (undated) DEVICE — GAUZE,SPONGE,8"X4",12PLY,XRAY,STRL,LF: Brand: MEDLINE

## (undated) DEVICE — PACK PROCEDURE SURG SET UP SRMC

## (undated) DEVICE — BANDAGE,GAUZE,CONFORMING,3"X75",STRL,LF: Brand: MEDLINE

## (undated) DEVICE — SPONGE GZ W4XL4IN COT 12 PLY TYP VII WVN C FLD DSGN

## (undated) DEVICE — DRESSING,GAUZE,XEROFORM,CURAD,5"X9",ST: Brand: CURAD

## (undated) DEVICE — SUTURE VCRL + SZ 3-0 L27IN ABSRB UD L26MM SH 1/2 CIR VCP416H

## (undated) DEVICE — GLOVE ORANGE PI 8 1/2   MSG9085